# Patient Record
Sex: MALE | Race: OTHER | NOT HISPANIC OR LATINO | URBAN - METROPOLITAN AREA
[De-identification: names, ages, dates, MRNs, and addresses within clinical notes are randomized per-mention and may not be internally consistent; named-entity substitution may affect disease eponyms.]

---

## 2018-08-05 ENCOUNTER — EMERGENCY (EMERGENCY)
Facility: HOSPITAL | Age: 53
LOS: 1 days | Discharge: HOME | End: 2018-08-05
Attending: EMERGENCY MEDICINE
Payer: COMMERCIAL

## 2018-08-05 VITALS — HEIGHT: 67 IN | WEIGHT: 134.04 LBS

## 2018-08-05 VITALS
DIASTOLIC BLOOD PRESSURE: 72 MMHG | TEMPERATURE: 98 F | RESPIRATION RATE: 20 BRPM | SYSTOLIC BLOOD PRESSURE: 117 MMHG | HEART RATE: 61 BPM | OXYGEN SATURATION: 100 %

## 2018-08-05 LAB
ALBUMIN SERPL ELPH-MCNC: 4.4 G/DL — SIGNIFICANT CHANGE UP (ref 3.3–5.2)
ALP SERPL-CCNC: 81 U/L — SIGNIFICANT CHANGE UP (ref 40–120)
ALT FLD-CCNC: 10 U/L — SIGNIFICANT CHANGE UP
ANION GAP SERPL CALC-SCNC: 13 MMOL/L — SIGNIFICANT CHANGE UP (ref 5–17)
APTT BLD: 35.9 SEC — SIGNIFICANT CHANGE UP (ref 27.5–37.4)
AST SERPL-CCNC: 18 U/L — SIGNIFICANT CHANGE UP
BASOPHILS # BLD AUTO: 0 K/UL — SIGNIFICANT CHANGE UP (ref 0–0.2)
BASOPHILS NFR BLD AUTO: 0.3 % — SIGNIFICANT CHANGE UP (ref 0–2)
BILIRUB SERPL-MCNC: 0.6 MG/DL — SIGNIFICANT CHANGE UP (ref 0.4–2)
BUN SERPL-MCNC: 10 MG/DL — SIGNIFICANT CHANGE UP (ref 8–20)
CALCIUM SERPL-MCNC: 9.9 MG/DL — SIGNIFICANT CHANGE UP (ref 8.6–10.2)
CHLORIDE SERPL-SCNC: 102 MMOL/L — SIGNIFICANT CHANGE UP (ref 98–107)
CO2 SERPL-SCNC: 26 MMOL/L — SIGNIFICANT CHANGE UP (ref 22–29)
CREAT SERPL-MCNC: 0.76 MG/DL — SIGNIFICANT CHANGE UP (ref 0.5–1.3)
EOSINOPHIL # BLD AUTO: 1 K/UL — HIGH (ref 0–0.5)
EOSINOPHIL NFR BLD AUTO: 11.3 % — HIGH (ref 0–5)
GLUCOSE SERPL-MCNC: 104 MG/DL — SIGNIFICANT CHANGE UP (ref 70–115)
HCT VFR BLD CALC: 47.8 % — SIGNIFICANT CHANGE UP (ref 42–52)
HGB BLD-MCNC: 15.8 G/DL — SIGNIFICANT CHANGE UP (ref 14–18)
INR BLD: 1.09 RATIO — SIGNIFICANT CHANGE UP (ref 0.88–1.16)
LYMPHOCYTES # BLD AUTO: 3.1 K/UL — SIGNIFICANT CHANGE UP (ref 1–4.8)
LYMPHOCYTES # BLD AUTO: 35 % — SIGNIFICANT CHANGE UP (ref 20–55)
MCHC RBC-ENTMCNC: 30.8 PG — SIGNIFICANT CHANGE UP (ref 27–31)
MCHC RBC-ENTMCNC: 33.1 G/DL — SIGNIFICANT CHANGE UP (ref 32–36)
MCV RBC AUTO: 93.2 FL — SIGNIFICANT CHANGE UP (ref 80–94)
MONOCYTES # BLD AUTO: 0.8 K/UL — SIGNIFICANT CHANGE UP (ref 0–0.8)
MONOCYTES NFR BLD AUTO: 8.9 % — SIGNIFICANT CHANGE UP (ref 3–10)
NEUTROPHILS # BLD AUTO: 3.9 K/UL — SIGNIFICANT CHANGE UP (ref 1.8–8)
NEUTROPHILS NFR BLD AUTO: 44.3 % — SIGNIFICANT CHANGE UP (ref 37–73)
PLATELET # BLD AUTO: 243 K/UL — SIGNIFICANT CHANGE UP (ref 150–400)
POTASSIUM SERPL-MCNC: 4.5 MMOL/L — SIGNIFICANT CHANGE UP (ref 3.5–5.3)
POTASSIUM SERPL-SCNC: 4.5 MMOL/L — SIGNIFICANT CHANGE UP (ref 3.5–5.3)
PROT SERPL-MCNC: 8.2 G/DL — SIGNIFICANT CHANGE UP (ref 6.6–8.7)
PROTHROM AB SERPL-ACNC: 12 SEC — SIGNIFICANT CHANGE UP (ref 9.8–12.7)
RBC # BLD: 5.13 M/UL — SIGNIFICANT CHANGE UP (ref 4.6–6.2)
RBC # FLD: 14 % — SIGNIFICANT CHANGE UP (ref 11–15.6)
SODIUM SERPL-SCNC: 141 MMOL/L — SIGNIFICANT CHANGE UP (ref 135–145)
WBC # BLD: 8.8 K/UL — SIGNIFICANT CHANGE UP (ref 4.8–10.8)
WBC # FLD AUTO: 8.8 K/UL — SIGNIFICANT CHANGE UP (ref 4.8–10.8)

## 2018-08-05 PROCEDURE — 85730 THROMBOPLASTIN TIME PARTIAL: CPT

## 2018-08-05 PROCEDURE — 36415 COLL VENOUS BLD VENIPUNCTURE: CPT

## 2018-08-05 PROCEDURE — 85027 COMPLETE CBC AUTOMATED: CPT

## 2018-08-05 PROCEDURE — 80053 COMPREHEN METABOLIC PANEL: CPT

## 2018-08-05 PROCEDURE — 99285 EMERGENCY DEPT VISIT HI MDM: CPT | Mod: 25

## 2018-08-05 PROCEDURE — 96374 THER/PROPH/DIAG INJ IV PUSH: CPT

## 2018-08-05 PROCEDURE — 85610 PROTHROMBIN TIME: CPT

## 2018-08-05 PROCEDURE — 76870 US EXAM SCROTUM: CPT | Mod: 26

## 2018-08-05 PROCEDURE — 76870 US EXAM SCROTUM: CPT

## 2018-08-05 PROCEDURE — 96375 TX/PRO/DX INJ NEW DRUG ADDON: CPT

## 2018-08-05 PROCEDURE — 99284 EMERGENCY DEPT VISIT MOD MDM: CPT | Mod: 25

## 2018-08-05 RX ORDER — SODIUM CHLORIDE 9 MG/ML
3 INJECTION INTRAMUSCULAR; INTRAVENOUS; SUBCUTANEOUS ONCE
Qty: 0 | Refills: 0 | Status: COMPLETED | OUTPATIENT
Start: 2018-08-05 | End: 2018-08-05

## 2018-08-05 RX ORDER — ONDANSETRON 8 MG/1
4 TABLET, FILM COATED ORAL ONCE
Qty: 0 | Refills: 0 | Status: COMPLETED | OUTPATIENT
Start: 2018-08-05 | End: 2018-08-05

## 2018-08-05 RX ORDER — MORPHINE SULFATE 50 MG/1
4 CAPSULE, EXTENDED RELEASE ORAL ONCE
Qty: 0 | Refills: 0 | Status: DISCONTINUED | OUTPATIENT
Start: 2018-08-05 | End: 2018-08-05

## 2018-08-05 RX ADMIN — ONDANSETRON 4 MILLIGRAM(S): 8 TABLET, FILM COATED ORAL at 10:06

## 2018-08-05 RX ADMIN — MORPHINE SULFATE 4 MILLIGRAM(S): 50 CAPSULE, EXTENDED RELEASE ORAL at 10:56

## 2018-08-05 RX ADMIN — MORPHINE SULFATE 4 MILLIGRAM(S): 50 CAPSULE, EXTENDED RELEASE ORAL at 10:07

## 2018-08-05 RX ADMIN — SODIUM CHLORIDE 3 MILLILITER(S): 9 INJECTION INTRAMUSCULAR; INTRAVENOUS; SUBCUTANEOUS at 10:06

## 2018-08-05 NOTE — ED ADULT NURSE NOTE - OBJECTIVE STATEMENT
pt c/o sudden pop sensation with immense pain after lifting paint. Left groin area swollen all the way down into scrotum

## 2018-08-05 NOTE — ED ADULT NURSE NOTE - IN THE PAST 12 MONTHS HAVE YOU USED DRUGS OTHER THAN THOSE REQUIRED FOR MEDICAL REASON?
HEARING AID ORIENTATION/FITTING    AUDIOLOGY  Hearing aids purchased 17: Oticon OPN2 miniRITEs silver gray  Domes: MiniFit bass double vent 10mm  Wax guards: ProWax miniFit  Speaker: #4 [phone]  Ser#s: [id number]  Warranty and L&D : 18  [employer]  # [id number] exp: 18  LK    Fitting checklist:  Hearing evaluation performed within past 6 months  Purchase agreement signed and reviewed warranty/hearing aid life expectancy  Payment collected and  receipt completed  Reviewed departmental policies    Patient shown parts of hearing aid(s):  volume control, battery compartment and on/off switch  Review of battery changing and safety. Battery size: 312. Level of assistance required:  independent  Insertion and removal of hearing aid/earmold. Level of assistance required:  independent  Cleaning of hearing aid/earmold. Level of assistance required:  independent.   Wax guard system:  Yes: ProWax miniFit  Reviewed hearing aid instruction booklet, troubleshooting.  Reviewed telephone use.   Accessories provided: LuCatchFree     Programming:  Software program/cords used: Genie2/ fitting link  Initial settings: Step 3 (experienced hearing aid wearer)  Program destinations:   1: universal   2. none   3. none   4. none    Patient Impressions:  He is very pleased with how well he can hear with these in comparison to his old Oticon Tego aids. He also feels they are more comfortable.     Audiologist Initial Evaluation of Performance:  He is able to hear me well at a distance of 15 feet as I asked random questions at a normal conversational level.     Further Recommendations of assistive listening devices and/or accessories:  Oticon ON JHONY, however he does not have a smart phone- this is recommended in the event that he ever gets a smart phone as it has a \"find my hearing aids\" feature that we discussed.     Follow-Up to be scheduled:  Two weeks.     
No

## 2018-08-05 NOTE — CONSULT NOTE ADULT - ASSESSMENT
54yo male presents with reducible left inguinal hernia 54yo male presents with reducible left inguinal hernia  -US evident for Bowel containing, large left scrotal hernia      Plan:  -No acute surgical intervention  -hernia reduced at bedside. no evident scenes of ischemia  -Pt to follow up in to [facility] for outpatient surgical repair  -Pt educated in smoking cessation in preparation for outpatient surgical repair and healthy lifestyle

## 2018-08-05 NOTE — ED PROVIDER NOTE - MEDICAL DECISION MAKING DETAILS
Surgery called  after physical exam due to concern for incarcerated hernia, labs, pain control and re-evaluate.

## 2018-08-05 NOTE — CONSULT NOTE ADULT - SUBJECTIVE AND OBJECTIVE BOX
-----TO BE DISCUSSED WITH ATTENDING-----        GENERAL SURGERY CONSULT    Consulting surgical team: [facility] - Acute Care Surgery   Consulting attending: Marilou  Patient seen and examined: 08-05-18 @ 08:50    HPI:  52yo male presents with 5 day history of left groin pain and bulge to area. Pt states he lifting 5 gallon jug of paint where he felt a pop and pain to left groin. he states bulge has increase over past several days. Today he report lower abdominal pain. Reports regular bowel movement and flatus. Denies nausea, vomiting, fever, and chills. Pain is worse when he strains for bowel movement.      PAST MEDICAL HISTORY:  Denies    PAST SURGICAL HISTORY:  Denies    SOCIAL HISTORY  Cigarettes (1 pack per day). Denies alcohol and illicit drugs.    ALLERGIES:  No Known Allergies      MEDICATIONS:  morphine  - Injectable 4 milliGRAM(s) IV Push once  ondansetron Injectable 4 milliGRAM(s) IV Push once  sodium chloride 0.9% lock flush 3 milliLiter(s) IV Push once      VITALS & I/Os:  Vital Signs Last 24 Hrs  T(C): 36.9 (05 Aug 2018 07:24), Max: 36.9 (05 Aug 2018 07:24)  T(F): 98.5 (05 Aug 2018 07:24), Max: 98.5 (05 Aug 2018 07:24)  HR: 66 (05 Aug 2018 07:24) (66 - 66)  BP: 105/67 (05 Aug 2018 07:24) (105/67 - 105/67)  BP(mean): --  RR: 18 (05 Aug 2018 07:24) (18 - 18)  SpO2: 100% (05 Aug 2018 07:24) (100% - 100%)    I&O's Summary      PHYSICAL EXAM:  General: No acute distress  Respiratory: Nonlabored  Cardiovascular: RRR  Abdominal: mild tenderness to left lower quadrant. no guarding or rebound.  Groin: soft bulge appreciated to left groin  : hernia with intestinal contents appreciated. Reducible. Testicles palpated.  Extremities: Warm  Vascular: Radial pulse 2+, bilateral    LABS:          Lactate:                    IMAGING:      ASSESSMENT:  53y Male    PLAN:      Discussed with  GENERAL SURGERY CONSULT    Consulting surgical team: [facility] - Acute [facility] Surgery   Consulting attending: Marilou  Patient seen and examined: 08-05-18 @ 08:50    HPI:  52yo male presents with 5 day history of left groin pain and bulge to area. Pt states he lifting 5 gallon jug of paint where he felt a pop and pain to left groin. he states bulge has increase over past several days. Today he report lower abdominal pain. Reports regular bowel movement and flatus. Denies nausea, vomiting, fever, and chills. Pain is worse when he strains for bowel movement.      PAST MEDICAL HISTORY:  Denies    PAST SURGICAL HISTORY:  Denies    SOCIAL HISTORY  Cigarettes (1 pack per day). Denies alcohol and illicit drugs.    ALLERGIES:  No Known Allergies      MEDICATIONS:  morphine  - Injectable 4 milliGRAM(s) IV Push once  ondansetron Injectable 4 milliGRAM(s) IV Push once  sodium chloride 0.9% lock flush 3 milliLiter(s) IV Push once      VITALS & I/Os:  Vital Signs Last 24 Hrs  T(C): 36.9 (05 Aug 2018 07:24), Max: 36.9 (05 Aug 2018 07:24)  T(F): 98.5 (05 Aug 2018 07:24), Max: 98.5 (05 Aug 2018 07:24)  HR: 66 (05 Aug 2018 07:24) (66 - 66)  BP: 105/67 (05 Aug 2018 07:24) (105/67 - 105/67)  BP(mean): --  RR: 18 (05 Aug 2018 07:24) (18 - 18)  SpO2: 100% (05 Aug 2018 07:24) (100% - 100%)    I&O's Summary      PHYSICAL EXAM:  General: No acute distress  Respiratory: Nonlabored  Cardiovascular: RRR  Abdominal: mild tenderness to left lower quadrant. no guarding or rebound.  Groin: soft bulge appreciated to left groin. No skin changes.  : hernia with intestinal contents appreciated. Reducible. Testicles palpated.  Extremities: Warm  Vascular: Radial pulse 2+, bilateral    LABS:          Lactate:                    IMAGING:      ASSESSMENT:  53y Male    PLAN:      Discussed with

## 2018-08-05 NOTE — ED PROVIDER NOTE - OBJECTIVE STATEMENT
52 y/o M with no pertinent PMHx presents to ED c/o L groin pain rated 8/10 s/p lifting several heavy paint cans a few days prior. Patient notes that immediately after lifting the cans had pain, but recently has noted a bulging area that extends into the scrotum. Denies fevers, chills, nausea, vomiting, CP or SOB. No further acute complaints at this time.

## 2018-08-05 NOTE — ED ADULT NURSE NOTE - NSIMPLEMENTINTERV_GEN_ALL_ED
Implemented All Universal Safety Interventions:  [place] to call system. Call bell, personal items and telephone within reach. Instruct patient to call for assistance. Room bathroom lighting operational. Non-slip footwear when patient is off stretcher. Physically safe environment: no spills, clutter or unnecessary equipment. Stretcher in lowest position, wheels locked, appropriate side rails in place.

## 2018-08-05 NOTE — ED PROVIDER NOTE - CONSTITUTIONAL, MLM
normal... Well appearing, well nourished, awake, alert, oriented to person, place, time/situation and is moderately uncomfortable.

## 2018-08-05 NOTE — ED PROVIDER NOTE - GENITOURINARY, MLM
L inguinal bulging mass that extends into the L hemiscrotum, tender to palpation. Unable to reduce with manual pressure.

## 2018-08-14 PROBLEM — Z00.00 ENCOUNTER FOR PREVENTIVE HEALTH EXAMINATION: Status: ACTIVE | Noted: 2018-08-14

## 2018-08-23 ENCOUNTER — APPOINTMENT (OUTPATIENT)
Dept: TRAUMA SURGERY | Facility: CLINIC | Age: 53
End: 2018-08-23
Payer: COMMERCIAL

## 2018-08-23 VITALS
HEART RATE: 83 BPM | BODY MASS INDEX: 20.88 KG/M2 | WEIGHT: 133 LBS | OXYGEN SATURATION: 3 % | HEIGHT: 67 IN | SYSTOLIC BLOOD PRESSURE: 108 MMHG | TEMPERATURE: 98 F | DIASTOLIC BLOOD PRESSURE: 73 MMHG

## 2018-08-23 PROCEDURE — 99213 OFFICE O/P EST LOW 20 MIN: CPT

## 2018-08-28 ENCOUNTER — OUTPATIENT (OUTPATIENT)
Dept: OUTPATIENT SERVICES | Facility: HOSPITAL | Age: 53
LOS: 1 days | End: 2018-08-28
Payer: COMMERCIAL

## 2018-08-28 VITALS
SYSTOLIC BLOOD PRESSURE: 103 MMHG | DIASTOLIC BLOOD PRESSURE: 71 MMHG | HEIGHT: 67 IN | HEART RATE: 77 BPM | TEMPERATURE: 98 F | RESPIRATION RATE: 16 BRPM | WEIGHT: 125.66 LBS

## 2018-08-28 DIAGNOSIS — Z01.818 ENCOUNTER FOR OTHER PREPROCEDURAL EXAMINATION: ICD-10-CM

## 2018-08-28 DIAGNOSIS — K40.90 UNILATERAL INGUINAL HERNIA, WITHOUT OBSTRUCTION OR GANGRENE, NOT SPECIFIED AS RECURRENT: ICD-10-CM

## 2018-08-28 DIAGNOSIS — Z29.9 ENCOUNTER FOR PROPHYLACTIC MEASURES, UNSPECIFIED: ICD-10-CM

## 2018-08-28 LAB
ANION GAP SERPL CALC-SCNC: 12 MMOL/L — SIGNIFICANT CHANGE UP (ref 5–17)
BLD GP AB SCN SERPL QL: SIGNIFICANT CHANGE UP
BUN SERPL-MCNC: 17 MG/DL — SIGNIFICANT CHANGE UP (ref 8–20)
CALCIUM SERPL-MCNC: 9.8 MG/DL — SIGNIFICANT CHANGE UP (ref 8.6–10.2)
CHLORIDE SERPL-SCNC: 101 MMOL/L — SIGNIFICANT CHANGE UP (ref 98–107)
CO2 SERPL-SCNC: 27 MMOL/L — SIGNIFICANT CHANGE UP (ref 22–29)
CREAT SERPL-MCNC: 0.93 MG/DL — SIGNIFICANT CHANGE UP (ref 0.5–1.3)
GLUCOSE SERPL-MCNC: 88 MG/DL — SIGNIFICANT CHANGE UP (ref 70–115)
HCT VFR BLD CALC: 48 % — SIGNIFICANT CHANGE UP (ref 42–52)
HGB BLD-MCNC: 15.9 G/DL — SIGNIFICANT CHANGE UP (ref 14–18)
MCHC RBC-ENTMCNC: 30.8 PG — SIGNIFICANT CHANGE UP (ref 27–31)
MCHC RBC-ENTMCNC: 33.1 G/DL — SIGNIFICANT CHANGE UP (ref 32–36)
MCV RBC AUTO: 93 FL — SIGNIFICANT CHANGE UP (ref 80–94)
PLATELET # BLD AUTO: 204 K/UL — SIGNIFICANT CHANGE UP (ref 150–400)
POTASSIUM SERPL-MCNC: 4.4 MMOL/L — SIGNIFICANT CHANGE UP (ref 3.5–5.3)
POTASSIUM SERPL-SCNC: 4.4 MMOL/L — SIGNIFICANT CHANGE UP (ref 3.5–5.3)
RBC # BLD: 5.16 M/UL — SIGNIFICANT CHANGE UP (ref 4.6–6.2)
RBC # FLD: 14.7 % — SIGNIFICANT CHANGE UP (ref 11–15.6)
SODIUM SERPL-SCNC: 140 MMOL/L — SIGNIFICANT CHANGE UP (ref 135–145)
TYPE + AB SCN PNL BLD: SIGNIFICANT CHANGE UP
WBC # BLD: 9.1 K/UL — SIGNIFICANT CHANGE UP (ref 4.8–10.8)
WBC # FLD AUTO: 9.1 K/UL — SIGNIFICANT CHANGE UP (ref 4.8–10.8)

## 2018-08-28 PROCEDURE — 85027 COMPLETE CBC AUTOMATED: CPT

## 2018-08-28 PROCEDURE — 86850 RBC ANTIBODY SCREEN: CPT

## 2018-08-28 PROCEDURE — 86900 BLOOD TYPING SEROLOGIC ABO: CPT

## 2018-08-28 PROCEDURE — 36415 COLL VENOUS BLD VENIPUNCTURE: CPT

## 2018-08-28 PROCEDURE — 86901 BLOOD TYPING SEROLOGIC RH(D): CPT

## 2018-08-28 PROCEDURE — 93010 ELECTROCARDIOGRAM REPORT: CPT

## 2018-08-28 PROCEDURE — 80048 BASIC METABOLIC PNL TOTAL CA: CPT

## 2018-08-28 PROCEDURE — G0463: CPT

## 2018-08-28 PROCEDURE — 93005 ELECTROCARDIOGRAM TRACING: CPT

## 2018-08-28 RX ORDER — SODIUM CHLORIDE 9 MG/ML
3 INJECTION INTRAMUSCULAR; INTRAVENOUS; SUBCUTANEOUS ONCE
Qty: 0 | Refills: 0 | Status: DISCONTINUED | OUTPATIENT
Start: 2018-08-31 | End: 2018-08-31

## 2018-08-28 RX ORDER — CEFAZOLIN SODIUM 1 G
2000 VIAL (EA) INJECTION ONCE
Qty: 0 | Refills: 0 | Status: DISCONTINUED | OUTPATIENT
Start: 2018-08-31 | End: 2018-09-15

## 2018-08-28 NOTE — H&P PST ADULT - ATTENDING COMMENTS
Left inguinal-scrotal hernia  Symptomatic  For repair.  Discussed risks, benefits, and alternatives, including the risk of ischemic orchitis.

## 2018-08-28 NOTE — H&P PST ADULT - NSANTHOSAYNRD_GEN_A_CORE
No. MOY screening performed.  STOP BANG Legend: 0-2 = LOW Risk; 3-4 = INTERMEDIATE Risk; 5-8 = HIGH Risk

## 2018-08-28 NOTE — H&P PST ADULT - HISTORY OF PRESENT ILLNESS
53 year old male with a left inguinal hernia 53 year old male with a left inguinal hernia states that he noticed it a year ago and has on an off pain

## 2018-08-28 NOTE — H&P PST ADULT - ASSESSMENT
CAPRINI SCORE [CLOT]    AGE RELATED RISK FACTORS                                                       MOBILITY RELATED FACTORS  [x ] Age 41-60 years                                            (1 Point)                  [ ] Bed rest                                                        (1 Point)  [ ] Age: 61-74 years                                           (2 Points)                 [ ] Plaster cast                                                   (2 Points)  [ ] Age= 75 years                                              (3 Points)                 [ ] Bed bound for more than 72 hours                 (2 Points)    DISEASE RELATED RISK FACTORS                                               GENDER SPECIFIC FACTORS  [ ] Edema in the lower extremities                       (1 Point)                  [ ] Pregnancy                                                     (1 Point)  [ ] Varicose veins                                               (1 Point)                  [ ] Post-partum < 6 weeks                                   (1 Point)             [ ] BMI > 25 Kg/m2                                            (1 Point)                  [ ] Hormonal therapy  or oral contraception          (1 Point)                 [ ] Sepsis (in the previous month)                        (1 Point)                  [ ] History of pregnancy complications                 (1 point)  [ ] Pneumonia or serious lung disease                                               [ ] Unexplained or recurrent                     (1 Point)           (in the previous month)                               (1 Point)  [ ] Abnormal pulmonary function test                     (1 Point)                 SURGERY RELATED RISK FACTORS  [ ] Acute myocardial infarction                              (1 Point)                 [ ]  Section                                             (1 Point)  [ ] Congestive heart failure (in the previous month)  (1 Point)               [ ] Minor surgery                                                  (1 Point)   [ ] Inflammatory bowel disease                             (1 Point)                 [ ] Arthroscopic surgery                                        (2 Points)  [ ] Central venous access                                      (2 Points)                [x ] General surgery lasting more than 45 minutes   (2 Points)       [ ] Stroke (in the previous month)                          (5 Points)               [ ] Elective arthroplasty                                         (5 Points)                                                                                                                                               HEMATOLOGY RELATED FACTORS                                                 TRAUMA RELATED RISK FACTORS  [ ] Prior episodes of VTE                                     (3 Points)                 [ ] Fracture of the hip, pelvis, or leg                       (5 Points)  [ ] Positive family history for VTE                         (3 Points)                 [ ] Acute spinal cord injury (in the previous month)  (5 Points)  [ ] Prothrombin 59477 A                                     (3 Points)                 [ ] Paralysis  (less than 1 month)                             (5 Points)  [ ] Factor V Leiden                                             (3 Points)                  [ ] Multiple Trauma within 1 month                        (5 Points)  [ ] Lupus anticoagulants                                     (3 Points)                                                           [ ] Anticardiolipin antibodies                               (3 Points)                                                       [ ] High homocysteine in the blood                      (3 Points)                                             [ ] Other congenital or acquired thrombophilia      (3 Points)                                                [ ] Heparin induced thrombocytopenia                  (3 Points)                                          Total Score [     3     ]

## 2018-08-30 ENCOUNTER — TRANSCRIPTION ENCOUNTER (OUTPATIENT)
Age: 53
End: 2018-08-30

## 2018-08-31 ENCOUNTER — RESULT REVIEW (OUTPATIENT)
Age: 53
End: 2018-08-31

## 2018-08-31 ENCOUNTER — OUTPATIENT (OUTPATIENT)
Dept: OUTPATIENT SERVICES | Facility: HOSPITAL | Age: 53
LOS: 1 days | End: 2018-08-31
Payer: COMMERCIAL

## 2018-08-31 VITALS
DIASTOLIC BLOOD PRESSURE: 56 MMHG | SYSTOLIC BLOOD PRESSURE: 98 MMHG | RESPIRATION RATE: 16 BRPM | WEIGHT: 134.92 LBS | TEMPERATURE: 98 F | HEART RATE: 74 BPM | OXYGEN SATURATION: 100 % | HEIGHT: 67 IN

## 2018-08-31 VITALS
OXYGEN SATURATION: 100 % | RESPIRATION RATE: 14 BRPM | HEART RATE: 70 BPM | DIASTOLIC BLOOD PRESSURE: 72 MMHG | SYSTOLIC BLOOD PRESSURE: 110 MMHG

## 2018-08-31 DIAGNOSIS — K40.20 BILATERAL INGUINAL HERNIA, WITHOUT OBSTRUCTION OR GANGRENE, NOT SPECIFIED AS RECURRENT: ICD-10-CM

## 2018-08-31 DIAGNOSIS — Z01.818 ENCOUNTER FOR OTHER PREPROCEDURAL EXAMINATION: ICD-10-CM

## 2018-08-31 PROCEDURE — 88302 TISSUE EXAM BY PATHOLOGIST: CPT | Mod: 26

## 2018-08-31 PROCEDURE — C1781: CPT

## 2018-08-31 PROCEDURE — 88302 TISSUE EXAM BY PATHOLOGIST: CPT

## 2018-08-31 PROCEDURE — 49507 PRP I/HERN INIT BLOCK >5 YR: CPT

## 2018-08-31 PROCEDURE — 49507 PRP I/HERN INIT BLOCK >5 YR: CPT | Mod: LT

## 2018-08-31 RX ORDER — ONDANSETRON 8 MG/1
4 TABLET, FILM COATED ORAL ONCE
Qty: 0 | Refills: 0 | Status: DISCONTINUED | OUTPATIENT
Start: 2018-08-31 | End: 2018-08-31

## 2018-08-31 RX ORDER — SODIUM CHLORIDE 9 MG/ML
1000 INJECTION, SOLUTION INTRAVENOUS
Qty: 0 | Refills: 0 | Status: DISCONTINUED | OUTPATIENT
Start: 2018-08-31 | End: 2018-08-31

## 2018-08-31 RX ORDER — FENTANYL CITRATE 50 UG/ML
50 INJECTION INTRAVENOUS
Qty: 0 | Refills: 0 | Status: DISCONTINUED | OUTPATIENT
Start: 2018-08-31 | End: 2018-08-31

## 2018-08-31 RX ORDER — TRAMADOL HYDROCHLORIDE 50 MG/1
1 TABLET ORAL
Qty: 12 | Refills: 0 | OUTPATIENT
Start: 2018-08-31

## 2018-08-31 NOTE — BRIEF OPERATIVE NOTE - PROCEDURE
<<-----Click on this checkbox to enter Procedure Inguinal hernia repair, left  08/31/2018  with mesh and plug  Active  BELKIS

## 2018-08-31 NOTE — ASU DISCHARGE PLAN (ADULT/PEDIATRIC). - NOTIFY
Fever greater than 101/Swelling that continues/Unable to Urinate/Pain not relieved by Medications/Inability to Tolerate Liquids or Foods/Bleeding that does not stop

## 2018-09-08 ENCOUNTER — TRANSCRIPTION ENCOUNTER (OUTPATIENT)
Age: 53
End: 2018-09-08

## 2018-09-08 ENCOUNTER — INPATIENT (INPATIENT)
Facility: HOSPITAL | Age: 53
LOS: 1 days | Discharge: HOME | DRG: 909 | End: 2018-09-10
Attending: STUDENT IN AN ORGANIZED HEALTH CARE EDUCATION/TRAINING PROGRAM | Admitting: STUDENT IN AN ORGANIZED HEALTH CARE EDUCATION/TRAINING PROGRAM
Payer: COMMERCIAL

## 2018-09-08 VITALS — HEIGHT: 67 IN | WEIGHT: 134.92 LBS

## 2018-09-08 DIAGNOSIS — R10.32 LEFT LOWER QUADRANT PAIN: ICD-10-CM

## 2018-09-08 DIAGNOSIS — Z98.890 OTHER SPECIFIED POSTPROCEDURAL STATES: Chronic | ICD-10-CM

## 2018-09-08 LAB
ALBUMIN SERPL ELPH-MCNC: 4 G/DL — SIGNIFICANT CHANGE UP (ref 3.3–5.2)
ALP SERPL-CCNC: 67 U/L — SIGNIFICANT CHANGE UP (ref 40–120)
ALT FLD-CCNC: 13 U/L — SIGNIFICANT CHANGE UP
ANION GAP SERPL CALC-SCNC: 13 MMOL/L — SIGNIFICANT CHANGE UP (ref 5–17)
APPEARANCE UR: CLEAR — SIGNIFICANT CHANGE UP
AST SERPL-CCNC: 15 U/L — SIGNIFICANT CHANGE UP
BILIRUB SERPL-MCNC: 1.1 MG/DL — SIGNIFICANT CHANGE UP (ref 0.4–2)
BILIRUB UR-MCNC: NEGATIVE — SIGNIFICANT CHANGE UP
BUN SERPL-MCNC: 19 MG/DL — SIGNIFICANT CHANGE UP (ref 8–20)
CALCIUM SERPL-MCNC: 10.2 MG/DL — SIGNIFICANT CHANGE UP (ref 8.6–10.2)
CHLORIDE SERPL-SCNC: 97 MMOL/L — LOW (ref 98–107)
CO2 SERPL-SCNC: 26 MMOL/L — SIGNIFICANT CHANGE UP (ref 22–29)
COLOR SPEC: YELLOW — SIGNIFICANT CHANGE UP
CREAT SERPL-MCNC: 0.76 MG/DL — SIGNIFICANT CHANGE UP (ref 0.5–1.3)
DIFF PNL FLD: ABNORMAL
EOSINOPHIL NFR BLD AUTO: 6 % — SIGNIFICANT CHANGE UP (ref 0–6)
GLUCOSE SERPL-MCNC: 70 MG/DL — SIGNIFICANT CHANGE UP (ref 70–115)
GLUCOSE UR QL: NEGATIVE MG/DL — SIGNIFICANT CHANGE UP
HCT VFR BLD CALC: 40.8 % — LOW (ref 42–52)
HGB BLD-MCNC: 13.5 G/DL — LOW (ref 14–18)
KETONES UR-MCNC: NEGATIVE — SIGNIFICANT CHANGE UP
LEUKOCYTE ESTERASE UR-ACNC: ABNORMAL
LYMPHOCYTES # BLD AUTO: 24 % — SIGNIFICANT CHANGE UP (ref 20–55)
MCHC RBC-ENTMCNC: 30.3 PG — SIGNIFICANT CHANGE UP (ref 27–31)
MCHC RBC-ENTMCNC: 33.1 G/DL — SIGNIFICANT CHANGE UP (ref 32–36)
MCV RBC AUTO: 91.7 FL — SIGNIFICANT CHANGE UP (ref 80–94)
MONOCYTES NFR BLD AUTO: 16 % — HIGH (ref 3–10)
NEUTROPHILS NFR BLD AUTO: 52 % — SIGNIFICANT CHANGE UP (ref 37–73)
NITRITE UR-MCNC: NEGATIVE — SIGNIFICANT CHANGE UP
PH UR: 6.5 — SIGNIFICANT CHANGE UP (ref 5–8)
PLATELET # BLD AUTO: 303 K/UL — SIGNIFICANT CHANGE UP (ref 150–400)
POTASSIUM SERPL-MCNC: 4.4 MMOL/L — SIGNIFICANT CHANGE UP (ref 3.5–5.3)
POTASSIUM SERPL-SCNC: 4.4 MMOL/L — SIGNIFICANT CHANGE UP (ref 3.5–5.3)
PROT SERPL-MCNC: 8.3 G/DL — SIGNIFICANT CHANGE UP (ref 6.6–8.7)
PROT UR-MCNC: 15 MG/DL
RBC # BLD: 4.45 M/UL — LOW (ref 4.6–6.2)
RBC # FLD: 14 % — SIGNIFICANT CHANGE UP (ref 11–15.6)
SODIUM SERPL-SCNC: 136 MMOL/L — SIGNIFICANT CHANGE UP (ref 135–145)
SP GR SPEC: 1.01 — SIGNIFICANT CHANGE UP (ref 1.01–1.02)
UROBILINOGEN FLD QL: 1 MG/DL
WBC # BLD: 16.4 K/UL — HIGH (ref 4.8–10.8)
WBC # FLD AUTO: 16.4 K/UL — HIGH (ref 4.8–10.8)

## 2018-09-08 PROCEDURE — 76870 US EXAM SCROTUM: CPT | Mod: 26

## 2018-09-08 PROCEDURE — 74177 CT ABD & PELVIS W/CONTRAST: CPT | Mod: 26

## 2018-09-08 PROCEDURE — 99284 EMERGENCY DEPT VISIT MOD MDM: CPT

## 2018-09-08 RX ORDER — ONDANSETRON 8 MG/1
4 TABLET, FILM COATED ORAL EVERY 6 HOURS
Qty: 0 | Refills: 0 | Status: DISCONTINUED | OUTPATIENT
Start: 2018-09-08 | End: 2018-09-09

## 2018-09-08 RX ORDER — SODIUM CHLORIDE 9 MG/ML
1000 INJECTION, SOLUTION INTRAVENOUS
Qty: 0 | Refills: 0 | Status: DISCONTINUED | OUTPATIENT
Start: 2018-09-08 | End: 2018-09-09

## 2018-09-08 RX ORDER — MORPHINE SULFATE 50 MG/1
2 CAPSULE, EXTENDED RELEASE ORAL EVERY 4 HOURS
Qty: 0 | Refills: 0 | Status: DISCONTINUED | OUTPATIENT
Start: 2018-09-08 | End: 2018-09-09

## 2018-09-08 RX ORDER — MORPHINE SULFATE 50 MG/1
4 CAPSULE, EXTENDED RELEASE ORAL EVERY 4 HOURS
Qty: 0 | Refills: 0 | Status: DISCONTINUED | OUTPATIENT
Start: 2018-09-08 | End: 2018-09-09

## 2018-09-08 RX ORDER — ACETAMINOPHEN 500 MG
650 TABLET ORAL EVERY 6 HOURS
Qty: 0 | Refills: 0 | Status: DISCONTINUED | OUTPATIENT
Start: 2018-09-08 | End: 2018-09-09

## 2018-09-08 RX ORDER — KETOROLAC TROMETHAMINE 30 MG/ML
30 SYRINGE (ML) INJECTION ONCE
Qty: 0 | Refills: 0 | Status: DISCONTINUED | OUTPATIENT
Start: 2018-09-08 | End: 2018-09-08

## 2018-09-08 RX ADMIN — Medication 30 MILLIGRAM(S): at 15:40

## 2018-09-08 RX ADMIN — Medication 30 MILLIGRAM(S): at 14:28

## 2018-09-08 RX ADMIN — SODIUM CHLORIDE 100 MILLILITER(S): 9 INJECTION, SOLUTION INTRAVENOUS at 23:41

## 2018-09-08 NOTE — ED ADULT NURSE REASSESSMENT NOTE - NS ED NURSE REASSESS COMMENT FT1
Assumed care of pt @ 2330. pt a+ox3, lying comfortably in bed. pt denies any pain or discomfort, awaiting bed. will continue to monitor.

## 2018-09-08 NOTE — ED STATDOCS - NS_ ATTENDINGSCRIBEDETAILS _ED_A_ED_FT
I, Sophy Mars, performed the initial face to face bedside interview with this patient regarding history of present illness, review of symptoms and relevant past medical, social and family history.  I completed an independent physical examination.  I was the provider who initially evaluated this patient.  The history, relevant review of systems, past medical and surgical history, medical decision making, and physical examination was documented by the scribe in my presence and I attest to the accuracy of the documentation. Follow-up on ordered tests (ie labs, radiologic studies) and re-evaluation of the patient's status has been communicated to the ACP.  Disposition of the patient will be based on test outcome and response to ED interventions.

## 2018-09-08 NOTE — ED STATDOCS - MEDICAL DECISION MAKING DETAILS
Patient with history right inguinal hernia repair with sudden onset pain and significant swelling to scrotum on exam.  Will give pain medication and have emergent US to rule out testicular torsion.  If US is negative, will consider CT scan to rule out other intraabdominal pathologies. Patient with history of right inguinal hernia repair with sudden onset pain and significant swelling to scrotum on exam.  Will give pain medication and have emergent US to rule out testicular torsion.  If US is negative, will consider CT scan to rule out other intraabdominal pathologies. Patient with history of right inguinal hernia repair with sudden onset pain and significant swelling to scrotum on exam.  Will give pain medication and have emergent US to rule out testicular torsion.  If US is negative, will consider CT scan to rule out other intra-abdominal pathologies.

## 2018-09-08 NOTE — ED STATDOCS - GENITOURINARY, MLM
normal... Cannot palpate left testicle because of significant left scrotal tenderness and swelling Non-tender right testicle; Left scrotal swelling unable to palpate left testicle; Left scrotal tenderness

## 2018-09-08 NOTE — ED ADULT NURSE NOTE - OBJECTIVE STATEMENT
54 y/o male presents to the ed c/o lower abdominal pain with radatio nto the testicles that started today. he states that he had inguinal hernia surgery on 8/31 by dr brantley and was doing fine up until the pain started today

## 2018-09-08 NOTE — H&P ADULT - HISTORY OF PRESENT ILLNESS
52 y/o M POD 8 s/p left inguinal hernia repair presents to ED with left groin and scrotal pain and swelling that started today. Patient states for the past week he was recovering well. Today, however, he noticed swelling and pain to left groin and scrotum. Pain worsened with movement. Patient denies trauma to the area. No increased activity. Normal bowel function. No hematuria, dysuria. No nausea or vomiting.

## 2018-09-08 NOTE — ED STATDOCS - SKIN, MLM
skin normal color for race, warm, dry and intact. skin normal color for race, warm, dry.  Wound clean and steri-strips intact.

## 2018-09-08 NOTE — ED STATDOCS - OBJECTIVE STATEMENT
Patient is a 53 year old M with no pertinent PMHx who presents to the ED complaining of 5/10 left sided abdominal pain and testicular swelling that started today.  States that the pain worsens with movement.  Patient was seen here 5 weeks for abdominal pain and it was found to have a hernia.  Had surgery by Dr. Swartz and has been resting and felt fine since.  However, he started with the pain today.  Took Aleve around 9 this morning with no relief.  Denies any urinary symptoms, fevers or other medical complaints at this time. Patient is a 53 year old M with no pertinent PMHx who presents to the ED complaining of 5/10 left sided abdominal pain and testicular swelling that started today.  States that the pain worsens with movement.  Patient was seen here 5 weeks for abdominal pain and it was found to have a hernia.  Had surgery by Dr. Lewis on 8/31 and has been resting and felt fine since.  However, he started with the pain today.  Took Aleve around 9 this morning with no relief.  Denies any urinary symptoms, fevers or other medical complaints at this time. Patient is a 53 year old M with no pertinent PMHx who presents to the ED complaining of 5/10 left sided abdominal pain and testicular swelling that started today.  States that the pain worsens with movement.  Patient was seen here 5 weeks ago for abdominal pain and was found to have a hernia.  Had surgery by Dr. Lewis on 8/31 and has been resting and felt fine since.  However, he started with pain today.  Took Aleve around 9 this morning with no relief.  Denies any urinary symptoms, fevers or other medical complaints at this time.  Has follow up scheduled for this Thursday (9/13). This patient is a 53 year old man with recent hernia repair 8 days ago who presents to the ED complaining of 5/10 left sided groin pain and testicular swelling that started today.  The pain is worse with movement and when he tries to stand or walk the pain increases to 10/10.  Patient states that he has been healing well since after surgery and the swelling was decreasing until he had sudden onset of symptoms today.  He took an aleve today around 9 am with no relief.  He denies dysuria, hematuria, and fever.  Patient has a follow up scheduled for this Thursday (9/13).

## 2018-09-08 NOTE — ED STATDOCS - PROGRESS NOTE DETAILS
PA NOTE: Pt seen by intake physician and HPI/ROS/PE/MDM reviewed. Pt seen and evaluated. Discussed plan and any resulted studies at this time. Will continue to monitor and re-evaluate. I Robert Cameron attest that this documentation has been prepared under the direction and in the presence of Sophy Mars    Multiple attempts made to reach US technician.  They were made aware of the urgency for patient to get exam right away. consulted surgery

## 2018-09-08 NOTE — ED STATDOCS - GASTROINTESTINAL, MLM
suprapubic tenderness, no right inguinal swelling or pain, swelling left inguinal canal with steri strips intact.  Tenderness to the left groin

## 2018-09-08 NOTE — H&P ADULT - ATTENDING COMMENTS
seen examined , avss , tolerated po diet, no n/v, +bm.  abd soft nt nd  left groin : tense +ttp to include the left scrotum. no cellulitis noted  labs imaging reviewed  plan  for OR for left groin exploration and evacuation of hematoma.

## 2018-09-08 NOTE — H&P ADULT - NSHPPHYSICALEXAM_GEN_ALL_CORE
General: NAD, AOx3, comfortable on examination  HEENT: PERRLA, EOMI, moist mucous membranes  Neck: supple, nontender  Cardiovascular: heart RRR, no murmurs  Respiratory: no respiratory distress, CTAB  Abdomen: soft, nontender, nondistended. No guarding or rebound. Normal bowel sounds. Moderate edema to left groin, without surrounding erythema. Left groin incision clean, dry, intact, without drainage. Edema extends into scrotum. Scrotum tense and tender to palpation.   Extremities: no peripheral edema. Normal ROM.  Integumentary: warm, no rash  Neuro: no motor or sensory deficits

## 2018-09-08 NOTE — ED STATDOCS - ATTESTATION, MLM
Yes
I have reviewed and confirmed nurses' notes for patient's medications, allergies, medical history, and surgical history.

## 2018-09-08 NOTE — H&P ADULT - ASSESSMENT
52 y/o M POD 8 s/p left inguinal hernia repair with clinical and radiographic evidence of left groin hematoma, possibly a complication of recent surgery. Patient is hemodynamically stable, afebrile.     Plan:  -Admit to Dr. Priscila CARUSO  -Operative intervention this evening  -NPO/IVF  -Pain control as needed  -Monitor vitals, labs, I/Os  -DVT ppx

## 2018-09-09 ENCOUNTER — RESULT REVIEW (OUTPATIENT)
Age: 53
End: 2018-09-09

## 2018-09-09 LAB
ANION GAP SERPL CALC-SCNC: 9 MMOL/L — SIGNIFICANT CHANGE UP (ref 5–17)
APTT BLD: 38.8 SEC — HIGH (ref 27.5–37.4)
BASOPHILS # BLD AUTO: 0 K/UL — SIGNIFICANT CHANGE UP (ref 0–0.2)
BLD GP AB SCN SERPL QL: SIGNIFICANT CHANGE UP
BUN SERPL-MCNC: 22 MG/DL — HIGH (ref 8–20)
CALCIUM SERPL-MCNC: 9.1 MG/DL — SIGNIFICANT CHANGE UP (ref 8.6–10.2)
CHLORIDE SERPL-SCNC: 99 MMOL/L — SIGNIFICANT CHANGE UP (ref 98–107)
CO2 SERPL-SCNC: 27 MMOL/L — SIGNIFICANT CHANGE UP (ref 22–29)
CREAT SERPL-MCNC: 0.86 MG/DL — SIGNIFICANT CHANGE UP (ref 0.5–1.3)
EOSINOPHIL # BLD AUTO: 0.7 K/UL — HIGH (ref 0–0.5)
EOSINOPHIL NFR BLD AUTO: 3.5 % — SIGNIFICANT CHANGE UP (ref 0–5)
GLUCOSE SERPL-MCNC: 107 MG/DL — SIGNIFICANT CHANGE UP (ref 70–115)
HCT VFR BLD CALC: 41.1 % — LOW (ref 42–52)
HGB BLD-MCNC: 13.2 G/DL — LOW (ref 14–18)
INR BLD: 1.3 RATIO — HIGH (ref 0.88–1.16)
LYMPHOCYTES # BLD AUTO: 2 K/UL — SIGNIFICANT CHANGE UP (ref 1–4.8)
LYMPHOCYTES # BLD AUTO: 9.4 % — LOW (ref 20–55)
MAGNESIUM SERPL-MCNC: 1.8 MG/DL — SIGNIFICANT CHANGE UP (ref 1.6–2.6)
MCHC RBC-ENTMCNC: 30.6 PG — SIGNIFICANT CHANGE UP (ref 27–31)
MCHC RBC-ENTMCNC: 32.1 G/DL — SIGNIFICANT CHANGE UP (ref 32–36)
MCV RBC AUTO: 95.1 FL — HIGH (ref 80–94)
MONOCYTES # BLD AUTO: 1.1 K/UL — HIGH (ref 0–0.8)
MONOCYTES NFR BLD AUTO: 5.2 % — SIGNIFICANT CHANGE UP (ref 3–10)
NEUTROPHILS # BLD AUTO: 16.8 K/UL — HIGH (ref 1.8–8)
NEUTROPHILS NFR BLD AUTO: 81.1 % — HIGH (ref 37–73)
PHOSPHATE SERPL-MCNC: 3 MG/DL — SIGNIFICANT CHANGE UP (ref 2.4–4.7)
PLATELET # BLD AUTO: 305 K/UL — SIGNIFICANT CHANGE UP (ref 150–400)
POTASSIUM SERPL-MCNC: 4.5 MMOL/L — SIGNIFICANT CHANGE UP (ref 3.5–5.3)
POTASSIUM SERPL-SCNC: 4.5 MMOL/L — SIGNIFICANT CHANGE UP (ref 3.5–5.3)
PROTHROM AB SERPL-ACNC: 14.4 SEC — HIGH (ref 9.8–12.7)
RBC # BLD: 4.32 M/UL — LOW (ref 4.6–6.2)
RBC # FLD: 13.6 % — SIGNIFICANT CHANGE UP (ref 11–15.6)
SODIUM SERPL-SCNC: 135 MMOL/L — SIGNIFICANT CHANGE UP (ref 135–145)
WBC # BLD: 20.8 K/UL — HIGH (ref 4.8–10.8)
WBC # FLD AUTO: 20.8 K/UL — HIGH (ref 4.8–10.8)

## 2018-09-09 PROCEDURE — 10140 I&D HMTMA SEROMA/FLUID COLLJ: CPT | Mod: 78

## 2018-09-09 PROCEDURE — 88304 TISSUE EXAM BY PATHOLOGIST: CPT | Mod: 26

## 2018-09-09 RX ORDER — OXYCODONE HYDROCHLORIDE 5 MG/1
5 TABLET ORAL EVERY 6 HOURS
Qty: 0 | Refills: 0 | Status: DISCONTINUED | OUTPATIENT
Start: 2018-09-09 | End: 2018-09-10

## 2018-09-09 RX ORDER — SENNA PLUS 8.6 MG/1
1 TABLET ORAL AT BEDTIME
Qty: 0 | Refills: 0 | Status: DISCONTINUED | OUTPATIENT
Start: 2018-09-09 | End: 2018-09-10

## 2018-09-09 RX ORDER — MORPHINE SULFATE 50 MG/1
2 CAPSULE, EXTENDED RELEASE ORAL EVERY 4 HOURS
Qty: 0 | Refills: 0 | Status: DISCONTINUED | OUTPATIENT
Start: 2018-09-09 | End: 2018-09-09

## 2018-09-09 RX ORDER — ONDANSETRON 8 MG/1
4 TABLET, FILM COATED ORAL EVERY 6 HOURS
Qty: 0 | Refills: 0 | Status: DISCONTINUED | OUTPATIENT
Start: 2018-09-09 | End: 2018-09-10

## 2018-09-09 RX ORDER — HYDROMORPHONE HYDROCHLORIDE 2 MG/ML
0.5 INJECTION INTRAMUSCULAR; INTRAVENOUS; SUBCUTANEOUS
Qty: 0 | Refills: 0 | Status: DISCONTINUED | OUTPATIENT
Start: 2018-09-09 | End: 2018-09-09

## 2018-09-09 RX ORDER — DOCUSATE SODIUM 100 MG
100 CAPSULE ORAL THREE TIMES A DAY
Qty: 0 | Refills: 0 | Status: DISCONTINUED | OUTPATIENT
Start: 2018-09-09 | End: 2018-09-10

## 2018-09-09 RX ORDER — ACETAMINOPHEN 500 MG
650 TABLET ORAL ONCE
Qty: 0 | Refills: 0 | Status: COMPLETED | OUTPATIENT
Start: 2018-09-09 | End: 2018-09-09

## 2018-09-09 RX ORDER — TRAMADOL HYDROCHLORIDE 50 MG/1
50 TABLET ORAL EVERY 6 HOURS
Qty: 0 | Refills: 0 | Status: DISCONTINUED | OUTPATIENT
Start: 2018-09-09 | End: 2018-09-10

## 2018-09-09 RX ORDER — ENOXAPARIN SODIUM 100 MG/ML
40 INJECTION SUBCUTANEOUS DAILY
Qty: 0 | Refills: 0 | Status: DISCONTINUED | OUTPATIENT
Start: 2018-09-09 | End: 2018-09-10

## 2018-09-09 RX ORDER — SODIUM CHLORIDE 9 MG/ML
1000 INJECTION, SOLUTION INTRAVENOUS
Qty: 0 | Refills: 0 | Status: DISCONTINUED | OUTPATIENT
Start: 2018-09-09 | End: 2018-09-09

## 2018-09-09 RX ORDER — ACETAMINOPHEN 500 MG
650 TABLET ORAL EVERY 6 HOURS
Qty: 0 | Refills: 0 | Status: DISCONTINUED | OUTPATIENT
Start: 2018-09-09 | End: 2018-09-10

## 2018-09-09 RX ORDER — MORPHINE SULFATE 50 MG/1
4 CAPSULE, EXTENDED RELEASE ORAL EVERY 4 HOURS
Qty: 0 | Refills: 0 | Status: DISCONTINUED | OUTPATIENT
Start: 2018-09-09 | End: 2018-09-09

## 2018-09-09 RX ORDER — INFLUENZA VIRUS VACCINE 15; 15; 15; 15 UG/.5ML; UG/.5ML; UG/.5ML; UG/.5ML
0.5 SUSPENSION INTRAMUSCULAR ONCE
Qty: 0 | Refills: 0 | Status: DISCONTINUED | OUTPATIENT
Start: 2018-09-09 | End: 2018-09-10

## 2018-09-09 RX ORDER — ONDANSETRON 8 MG/1
4 TABLET, FILM COATED ORAL ONCE
Qty: 0 | Refills: 0 | Status: DISCONTINUED | OUTPATIENT
Start: 2018-09-09 | End: 2018-09-09

## 2018-09-09 RX ADMIN — Medication 650 MILLIGRAM(S): at 15:13

## 2018-09-09 RX ADMIN — SENNA PLUS 1 TABLET(S): 8.6 TABLET ORAL at 21:47

## 2018-09-09 RX ADMIN — Medication 650 MILLIGRAM(S): at 19:24

## 2018-09-09 RX ADMIN — Medication 100 MILLIGRAM(S): at 13:08

## 2018-09-09 RX ADMIN — ENOXAPARIN SODIUM 40 MILLIGRAM(S): 100 INJECTION SUBCUTANEOUS at 12:24

## 2018-09-09 RX ADMIN — Medication 100 MILLIGRAM(S): at 21:47

## 2018-09-09 RX ADMIN — Medication 650 MILLIGRAM(S): at 18:40

## 2018-09-09 RX ADMIN — OXYCODONE HYDROCHLORIDE 5 MILLIGRAM(S): 5 TABLET ORAL at 14:30

## 2018-09-09 RX ADMIN — Medication 650 MILLIGRAM(S): at 14:30

## 2018-09-09 RX ADMIN — ONDANSETRON 4 MILLIGRAM(S): 8 TABLET, FILM COATED ORAL at 14:48

## 2018-09-09 RX ADMIN — OXYCODONE HYDROCHLORIDE 5 MILLIGRAM(S): 5 TABLET ORAL at 13:38

## 2018-09-09 RX ADMIN — Medication 650 MILLIGRAM(S): at 21:47

## 2018-09-09 RX ADMIN — Medication 650 MILLIGRAM(S): at 22:30

## 2018-09-09 NOTE — PROGRESS NOTE ADULT - ASSESSMENT
52 y/o M POD 8 s/p left inguinal hernia repair who presents with L groin hematoma, not s/p evacuation. Doing well    -regular diet  -RELL teaching  -OOB  -pain control  -resume DVT prophylaxis

## 2018-09-09 NOTE — PROGRESS NOTE ADULT - SUBJECTIVE AND OBJECTIVE BOX
S/P evac of hematoma post hernia repair  Feels much better  Dressing dry intact  RELL scant  WBC 20  supportive care

## 2018-09-09 NOTE — BRIEF OPERATIVE NOTE - OPERATION/FINDINGS
Large amount of clots inside the scrotum ~300ml Large amount of clots inside the scrotum ~300ml  Mesh intact  A RELL drain left in the scrotal cavity

## 2018-09-09 NOTE — PROGRESS NOTE ADULT - SUBJECTIVE AND OBJECTIVE BOX
INTERVAL HPI/OVERNIGHT EVENTS: Patient was taken to the OR this AM for evacuation of hematoma of L groin.     SUBJECTIVE: Patient doing well this AM. No N/V, fevers/chills. Pain under control       MEDICATIONS  (STANDING):  docusate sodium 100 milliGRAM(s) Oral three times a day  enoxaparin Injectable 40 milliGRAM(s) SubCutaneous daily  influenza   Vaccine 0.5 milliLiter(s) IntraMuscular once  senna 1 Tablet(s) Oral at bedtime    MEDICATIONS  (PRN):  acetaminophen   Tablet .. 650 milliGRAM(s) Oral every 6 hours PRN Mild Pain (1 - 3)  ondansetron Injectable 4 milliGRAM(s) IV Push every 6 hours PRN Nausea  oxyCODONE    IR 5 milliGRAM(s) Oral every 6 hours PRN Severe Pain (7 - 10)  traMADol 50 milliGRAM(s) Oral every 6 hours PRN Severe Pain (7 - 10)      Vital Signs Last 24 Hrs  T(C): 36.6 (09 Sep 2018 08:02), Max: 36.8 (09 Sep 2018 07:35)  T(F): 97.8 (09 Sep 2018 08:02), Max: 98.2 (09 Sep 2018 07:35)  HR: 88 (09 Sep 2018 08:02) (69 - 88)  BP: 107/72 (09 Sep 2018 08:02) (88/59 - 120/69)  BP(mean): --  RR: 16 (09 Sep 2018 08:02) (14 - 20)  SpO2: 95% (09 Sep 2018 08:02) (94% - 100%)    PE  General: NAD, AOx3  	Neck: supple, nontender  	Respiratory: no respiratory distress  	Abdomen: soft, nontender, nondistended. Dressing over groin hematoma evacuation c/d/i.   	Extremities: no peripheral edema. Normal ROM.  	Integumentary: warm, no rash  Neuro: no motor or sensory deficits      I&O's Detail    08 Sep 2018 07:01  -  09 Sep 2018 07:00  --------------------------------------------------------  IN:    lactated ringers.: 100 mL  Total IN: 100 mL    OUT:    Bulb: 15 mL    Estimated Blood Loss: 10 mL    Voided: 700 mL  Total OUT: 725 mL    Total NET: -625 mL      09 Sep 2018 07:01  -  09 Sep 2018 16:19  --------------------------------------------------------  IN:    lactated ringers.: 100 mL    Oral Fluid: 500 mL  Total IN: 600 mL    OUT:    Bulb: 35 mL    Voided: 750 mL  Total OUT: 785 mL    Total NET: -185 mL          LABS:                        13.2   20.8  )-----------( 305      ( 09 Sep 2018 05:37 )             41.1         135  |  99  |  22.0<H>  ----------------------------<  107  4.5   |  27.0  |  0.86    Ca    9.1      09 Sep 2018 05:37  Phos  3.0       Mg     1.8         TPro  8.3  /  Alb  4.0  /  TBili  1.1  /  DBili  x   /  AST  15  /  ALT  13  /  AlkPhos  67      PT/INR - ( 08 Sep 2018 23:51 )   PT: 14.4 sec;   INR: 1.30 ratio         PTT - ( 08 Sep 2018 23:51 )  PTT:38.8 sec  Urinalysis Basic - ( 08 Sep 2018 14:51 )    Color: Yellow / Appearance: Clear / S.015 / pH: x  Gluc: x / Ketone: Negative  / Bili: Negative / Urobili: 1 mg/dL   Blood: x / Protein: 15 mg/dL / Nitrite: Negative   Leuk Esterase: Trace / RBC: 0-2 /HPF / WBC 3-5   Sq Epi: x / Non Sq Epi: Occasional / Bacteria: x        RADIOLOGY & ADDITIONAL STUDIES:

## 2018-09-09 NOTE — BRIEF OPERATIVE NOTE - PROCEDURE
<<-----Click on this checkbox to enter Procedure Evacuation of hematoma of left groin  09/09/2018    Active  AMPRABHU  Exploration of groin  09/09/2018    Active  REEMA

## 2018-09-10 ENCOUNTER — TRANSCRIPTION ENCOUNTER (OUTPATIENT)
Age: 53
End: 2018-09-10

## 2018-09-10 VITALS
OXYGEN SATURATION: 97 % | DIASTOLIC BLOOD PRESSURE: 61 MMHG | TEMPERATURE: 99 F | RESPIRATION RATE: 18 BRPM | HEART RATE: 74 BPM | SYSTOLIC BLOOD PRESSURE: 105 MMHG

## 2018-09-10 LAB
ANION GAP SERPL CALC-SCNC: 9 MMOL/L — SIGNIFICANT CHANGE UP (ref 5–17)
BUN SERPL-MCNC: 19 MG/DL — SIGNIFICANT CHANGE UP (ref 8–20)
CALCIUM SERPL-MCNC: 9.1 MG/DL — SIGNIFICANT CHANGE UP (ref 8.6–10.2)
CHLORIDE SERPL-SCNC: 102 MMOL/L — SIGNIFICANT CHANGE UP (ref 98–107)
CO2 SERPL-SCNC: 26 MMOL/L — SIGNIFICANT CHANGE UP (ref 22–29)
CREAT SERPL-MCNC: 0.88 MG/DL — SIGNIFICANT CHANGE UP (ref 0.5–1.3)
GLUCOSE SERPL-MCNC: 99 MG/DL — SIGNIFICANT CHANGE UP (ref 70–115)
HCT VFR BLD CALC: 36.2 % — LOW (ref 42–52)
HGB BLD-MCNC: 12 G/DL — LOW (ref 14–18)
MAGNESIUM SERPL-MCNC: 1.9 MG/DL — SIGNIFICANT CHANGE UP (ref 1.6–2.6)
MCHC RBC-ENTMCNC: 30.1 PG — SIGNIFICANT CHANGE UP (ref 27–31)
MCHC RBC-ENTMCNC: 33.1 G/DL — SIGNIFICANT CHANGE UP (ref 32–36)
MCV RBC AUTO: 90.7 FL — SIGNIFICANT CHANGE UP (ref 80–94)
PHOSPHATE SERPL-MCNC: 3.1 MG/DL — SIGNIFICANT CHANGE UP (ref 2.4–4.7)
PLATELET # BLD AUTO: 331 K/UL — SIGNIFICANT CHANGE UP (ref 150–400)
POTASSIUM SERPL-MCNC: 4.4 MMOL/L — SIGNIFICANT CHANGE UP (ref 3.5–5.3)
POTASSIUM SERPL-SCNC: 4.4 MMOL/L — SIGNIFICANT CHANGE UP (ref 3.5–5.3)
RBC # BLD: 3.99 M/UL — LOW (ref 4.6–6.2)
RBC # FLD: 13.4 % — SIGNIFICANT CHANGE UP (ref 11–15.6)
SODIUM SERPL-SCNC: 137 MMOL/L — SIGNIFICANT CHANGE UP (ref 135–145)
WBC # BLD: 18.2 K/UL — HIGH (ref 4.8–10.8)
WBC # FLD AUTO: 18.2 K/UL — HIGH (ref 4.8–10.8)

## 2018-09-10 RX ORDER — TRAMADOL HYDROCHLORIDE 50 MG/1
1 TABLET ORAL
Qty: 12 | Refills: 0
Start: 2018-09-10 | End: 2018-09-12

## 2018-09-10 RX ORDER — ACETAMINOPHEN 500 MG
2 TABLET ORAL
Qty: 0 | Refills: 0 | COMMUNITY
Start: 2018-09-10

## 2018-09-10 RX ORDER — TRAMADOL HYDROCHLORIDE 50 MG/1
1 TABLET ORAL
Qty: 0 | Refills: 0 | COMMUNITY
Start: 2018-09-10

## 2018-09-10 RX ADMIN — TRAMADOL HYDROCHLORIDE 50 MILLIGRAM(S): 50 TABLET ORAL at 18:50

## 2018-09-10 RX ADMIN — ENOXAPARIN SODIUM 40 MILLIGRAM(S): 100 INJECTION SUBCUTANEOUS at 14:26

## 2018-09-10 RX ADMIN — OXYCODONE HYDROCHLORIDE 5 MILLIGRAM(S): 5 TABLET ORAL at 05:06

## 2018-09-10 RX ADMIN — TRAMADOL HYDROCHLORIDE 50 MILLIGRAM(S): 50 TABLET ORAL at 10:16

## 2018-09-10 RX ADMIN — TRAMADOL HYDROCHLORIDE 50 MILLIGRAM(S): 50 TABLET ORAL at 17:51

## 2018-09-10 RX ADMIN — Medication 100 MILLIGRAM(S): at 04:26

## 2018-09-10 RX ADMIN — Medication 100 MILLIGRAM(S): at 14:26

## 2018-09-10 RX ADMIN — TRAMADOL HYDROCHLORIDE 50 MILLIGRAM(S): 50 TABLET ORAL at 09:18

## 2018-09-10 RX ADMIN — OXYCODONE HYDROCHLORIDE 5 MILLIGRAM(S): 5 TABLET ORAL at 04:26

## 2018-09-10 NOTE — DISCHARGE NOTE ADULT - CARE PLAN
Principal Discharge DX:	Inguinal pain, left  Goal:	resolution of bleeding  Assessment and plan of treatment:	You were taken to the OR for a left groin exploration and evacuation of a hematoma. For pain, you can alternate between ibuprofen and tylenol every 6 hours. For severe pain, you can take tramadol. Please remember not to drive, operate heavy machinery, or make important decisions while on narcotics.     Please go to the nearest ED if you experience any of the following symptoms: fever, chills, nausea, vomiting, increasing pain to groin, bleeding, expanding bruising.    Otherwise, please follow up at the [facility] in 10-14 days.  [facility],   [address] [id number] Principal Discharge DX:	Inguinal pain, left  Goal:	resolution of bleeding  Assessment and plan of treatment:	You were taken to the OR for a left groin exploration and evacuation of a hematoma. For pain, you can alternate between ibuprofen and tylenol every 6 hours. For severe pain, you can take tramadol. Please remember not to drive, operate heavy machinery, or make important decisions while on narcotics.     Please go to the nearest ED if you experience any of the following symptoms: fever, chills, nausea, vomiting, increasing pain to groin, bleeding, expanding bruising.    Otherwise, please follow up at the [facility] in 10-14 days.  [facility],   [address] [place] [address]  Goal:	,

## 2018-09-10 NOTE — DISCHARGE NOTE ADULT - MEDICATION SUMMARY - MEDICATIONS TO TAKE
I will START or STAY ON the medications listed below when I get home from the hospital:    acetaminophen 325 mg oral tablet  -- 2 tab(s) by mouth every 6 hours, As needed, Mild Pain (1 - 3)  -- Indication: For pain    traMADol 50 mg oral tablet  -- 1 tab(s) by mouth every 6 hours, As Needed -Severe Pain (7 - 10) - for severe pain MDD:max 4tabs   -- Indication: For Severe pain

## 2018-09-10 NOTE — DISCHARGE NOTE ADULT - PLAN OF CARE
resolution of bleeding You were taken to the OR for a left groin exploration and evacuation of a hematoma. For pain, you can alternate between ibuprofen and tylenol every 6 hours. For severe pain, you can take tramadol. Please remember not to drive, operate heavy machinery, or make important decisions while on narcotics.     Please go to the nearest ED if you experience any of the following symptoms: fever, chills, nausea, vomiting, increasing pain to groin, bleeding, expanding bruising.    Otherwise, please follow up at the [facility] in 10-14 days.  [facility],   [address] [place] [address] ,

## 2018-09-10 NOTE — DISCHARGE NOTE ADULT - PATIENT PORTAL LINK FT
You can access the [facility] Patient Portal, offered by [facility], by registering with the following website: [email or web address]

## 2018-09-10 NOTE — DISCHARGE NOTE ADULT - PROVIDER TOKENS
FREE:[LAST:[[facility]],PHONE:[phone]],FAX:[(   )    -],ADDRESS:[[facility] [place] [address] [place] [address]]]

## 2018-09-10 NOTE — DISCHARGE NOTE ADULT - HOSPITAL COURSE
On 9/8, 54 y/o M POD 8 s/p left inguinal hernia repair presents to ED with left groin and scrotal pain and swelling that started today. Patient states for the past week he was recovering well. Today, however, he noticed swelling and pain to left groin and scrotum. Pain worsened with movement. Patient denies trauma to the area. No increased activity. Normal bowel function. No hematuria, dysuria. No nausea or vomiting.    Patient was taken to the OR for an exploration and evacuation of hematoma on the left groin. There were large amounts of clots ~300mL. a RELL drain was placed with minimal output. On 9/10, it was removed.     On d/c patient is afebrile, VSS, ambulating, and tolerating a regular diet.

## 2018-09-12 LAB — SURGICAL PATHOLOGY FINAL REPORT - CH: SIGNIFICANT CHANGE UP

## 2018-09-13 ENCOUNTER — APPOINTMENT (OUTPATIENT)
Dept: TRAUMA SURGERY | Facility: CLINIC | Age: 53
End: 2018-09-13
Payer: COMMERCIAL

## 2018-09-13 VITALS
HEIGHT: 67 IN | WEIGHT: 128 LBS | OXYGEN SATURATION: 98 % | HEART RATE: 88 BPM | BODY MASS INDEX: 20.09 KG/M2 | DIASTOLIC BLOOD PRESSURE: 74 MMHG | TEMPERATURE: 97.7 F | SYSTOLIC BLOOD PRESSURE: 119 MMHG

## 2018-09-13 PROCEDURE — 99024 POSTOP FOLLOW-UP VISIT: CPT

## 2018-09-18 ENCOUNTER — APPOINTMENT (OUTPATIENT)
Dept: TRAUMA SURGERY | Facility: CLINIC | Age: 53
End: 2018-09-18
Payer: COMMERCIAL

## 2018-09-18 VITALS
WEIGHT: 125 LBS | HEART RATE: 71 BPM | TEMPERATURE: 97.6 F | SYSTOLIC BLOOD PRESSURE: 107 MMHG | DIASTOLIC BLOOD PRESSURE: 65 MMHG | BODY MASS INDEX: 19.58 KG/M2 | OXYGEN SATURATION: 97 %

## 2018-09-18 PROCEDURE — 99024 POSTOP FOLLOW-UP VISIT: CPT

## 2018-09-21 ENCOUNTER — APPOINTMENT (OUTPATIENT)
Dept: TRAUMA SURGERY | Facility: CLINIC | Age: 53
End: 2018-09-21
Payer: COMMERCIAL

## 2018-09-21 VITALS
SYSTOLIC BLOOD PRESSURE: 118 MMHG | TEMPERATURE: 97.9 F | DIASTOLIC BLOOD PRESSURE: 79 MMHG | BODY MASS INDEX: 19.78 KG/M2 | OXYGEN SATURATION: 100 % | HEIGHT: 67 IN | WEIGHT: 126 LBS | HEART RATE: 105 BPM

## 2018-09-21 PROCEDURE — 99024 POSTOP FOLLOW-UP VISIT: CPT

## 2018-09-27 ENCOUNTER — APPOINTMENT (OUTPATIENT)
Dept: TRAUMA SURGERY | Facility: CLINIC | Age: 53
End: 2018-09-27
Payer: COMMERCIAL

## 2018-09-27 VITALS
BODY MASS INDEX: 20.25 KG/M2 | TEMPERATURE: 98 F | HEIGHT: 67 IN | HEART RATE: 76 BPM | OXYGEN SATURATION: 100 % | DIASTOLIC BLOOD PRESSURE: 76 MMHG | SYSTOLIC BLOOD PRESSURE: 123 MMHG | WEIGHT: 129 LBS

## 2018-09-27 PROCEDURE — 99024 POSTOP FOLLOW-UP VISIT: CPT

## 2018-10-30 ENCOUNTER — APPOINTMENT (OUTPATIENT)
Dept: TRAUMA SURGERY | Facility: CLINIC | Age: 53
End: 2018-10-30
Payer: COMMERCIAL

## 2018-10-30 VITALS
HEIGHT: 67 IN | DIASTOLIC BLOOD PRESSURE: 72 MMHG | SYSTOLIC BLOOD PRESSURE: 114 MMHG | HEART RATE: 85 BPM | OXYGEN SATURATION: 99 % | WEIGHT: 129 LBS | TEMPERATURE: 98.1 F | BODY MASS INDEX: 20.25 KG/M2

## 2018-10-30 DIAGNOSIS — K40.90 UNILATERAL INGUINAL HERNIA, W/OUT OBSTRUCTION OR GANGRENE, NOT SPECIFIED AS RECURRENT: ICD-10-CM

## 2018-10-30 DIAGNOSIS — S30.22XA CONTUSION OF SCROTUM AND TESTES, INITIAL ENCOUNTER: ICD-10-CM

## 2018-10-30 PROCEDURE — 99024 POSTOP FOLLOW-UP VISIT: CPT

## 2018-10-31 PROBLEM — K40.90 LEFT INGUINAL HERNIA: Status: ACTIVE | Noted: 2018-08-23

## 2018-10-31 PROBLEM — S30.22XA SCROTAL HEMATOMA: Status: ACTIVE | Noted: 2018-09-14

## 2018-11-27 ENCOUNTER — APPOINTMENT (OUTPATIENT)
Dept: TRAUMA SURGERY | Facility: CLINIC | Age: 53
End: 2018-11-27

## 2018-12-26 PROCEDURE — 99285 EMERGENCY DEPT VISIT HI MDM: CPT | Mod: 25

## 2018-12-26 PROCEDURE — 81001 URINALYSIS AUTO W/SCOPE: CPT

## 2018-12-26 PROCEDURE — 84100 ASSAY OF PHOSPHORUS: CPT

## 2018-12-26 PROCEDURE — 74177 CT ABD & PELVIS W/CONTRAST: CPT

## 2018-12-26 PROCEDURE — 80048 BASIC METABOLIC PNL TOTAL CA: CPT

## 2018-12-26 PROCEDURE — 76870 US EXAM SCROTUM: CPT

## 2018-12-26 PROCEDURE — 36415 COLL VENOUS BLD VENIPUNCTURE: CPT

## 2018-12-26 PROCEDURE — 85610 PROTHROMBIN TIME: CPT

## 2018-12-26 PROCEDURE — 96374 THER/PROPH/DIAG INJ IV PUSH: CPT | Mod: XU

## 2018-12-26 PROCEDURE — 85027 COMPLETE CBC AUTOMATED: CPT

## 2018-12-26 PROCEDURE — 88304 TISSUE EXAM BY PATHOLOGIST: CPT

## 2018-12-26 PROCEDURE — 83735 ASSAY OF MAGNESIUM: CPT

## 2018-12-26 PROCEDURE — 80053 COMPREHEN METABOLIC PANEL: CPT

## 2018-12-26 PROCEDURE — 86900 BLOOD TYPING SEROLOGIC ABO: CPT

## 2018-12-26 PROCEDURE — 86850 RBC ANTIBODY SCREEN: CPT

## 2018-12-26 PROCEDURE — 86901 BLOOD TYPING SEROLOGIC RH(D): CPT

## 2018-12-26 PROCEDURE — 85730 THROMBOPLASTIN TIME PARTIAL: CPT

## 2019-08-02 ENCOUNTER — EMERGENCY (EMERGENCY)
Facility: HOSPITAL | Age: 54
LOS: 1 days | Discharge: HOME | End: 2019-08-02
Attending: EMERGENCY MEDICINE
Payer: COMMERCIAL

## 2019-08-02 VITALS
TEMPERATURE: 98 F | WEIGHT: 134.92 LBS | SYSTOLIC BLOOD PRESSURE: 104 MMHG | HEIGHT: 67 IN | OXYGEN SATURATION: 98 % | DIASTOLIC BLOOD PRESSURE: 73 MMHG | HEART RATE: 69 BPM | RESPIRATION RATE: 20 BRPM

## 2019-08-02 DIAGNOSIS — Z98.890 OTHER SPECIFIED POSTPROCEDURAL STATES: Chronic | ICD-10-CM

## 2019-08-02 PROCEDURE — 99285 EMERGENCY DEPT VISIT HI MDM: CPT

## 2019-08-02 RX ORDER — SODIUM CHLORIDE 9 MG/ML
1000 INJECTION INTRAMUSCULAR; INTRAVENOUS; SUBCUTANEOUS ONCE
Refills: 0 | Status: COMPLETED | OUTPATIENT
Start: 2019-08-02 | End: 2019-08-02

## 2019-08-02 RX ORDER — ONDANSETRON 8 MG/1
4 TABLET, FILM COATED ORAL ONCE
Refills: 0 | Status: COMPLETED | OUTPATIENT
Start: 2019-08-02 | End: 2019-08-03

## 2019-08-02 RX ORDER — FAMOTIDINE 10 MG/ML
20 INJECTION INTRAVENOUS ONCE
Refills: 0 | Status: COMPLETED | OUTPATIENT
Start: 2019-08-02 | End: 2019-08-02

## 2019-08-02 NOTE — ED ADULT TRIAGE NOTE - CHIEF COMPLAINT QUOTE
c/o of generalized intermittent squeezing non radiating abd pain x2weeks with worsening today  and weakness with associated nausea, denies fever

## 2019-08-02 NOTE — ED STATDOCS - ATTENDING CONTRIBUTION TO CARE
I, Rosetta Galdamez, performed the initial face to face bedside interview with this patient regarding history of present illness, review of symptoms and relevant past medical, social and family history.  I completed an independent physical examination.  I was the initial provider who evaluated this patient. I have signed out the follow up of any pending tests (i.e. labs, radiological studies) to the ACP.  I have communicated the patient’s plan of care and disposition with the ACP.  The history, relevant review of systems, past medical and surgical history, medical decision making, and physical examination was documented by the scribe in my presence and I attest to the accuracy of the documentation.

## 2019-08-02 NOTE — ED STATDOCS - CLINICAL SUMMARY MEDICAL DECISION MAKING FREE TEXT BOX
abdominal pain after eating food yesterday daniel could have been fried , greasy or spicey. tender ruq. labs iv iv meds us reassess

## 2019-08-02 NOTE — ED STATDOCS - PROGRESS NOTE DETAILS
PA note: PT evaluated by intake physician. HPI/PE/ROS as noted above. Will follow up plan per intake physician US negative. VSS, pt resting comfortably in stretcher, US negative. VSS, pt resting comfortably in stretcher, labs wnl. Abdomen: +BS x 4, soft, non-tender, non-distended, no rebound or guarding. Pt provided with copy of all results. Return precautions discussed. Pt stable for dc at this time.

## 2019-08-02 NOTE — ED STATDOCS - OBJECTIVE STATEMENT
53 y/o M pt presents to ED c/o RUQ abdominal pain that began this morning. Pt describes his pain as intermittent cramping. Pt says that he has experienced similar Sx before and felt this particular episode coming on. However his past episodes usually resolved on their own. Pt says that his episodes usually occur after he eats greasy food. Denies ear aches, coughing, CP, N/V/D, fever, chills recent sick contacts. Also denies FHx of kidney stones, cholecystitis, cholelithiases. No further complaints at this time.

## 2019-08-03 ENCOUNTER — EMERGENCY (EMERGENCY)
Facility: HOSPITAL | Age: 54
LOS: 1 days | Discharge: HOME | End: 2019-08-03
Attending: EMERGENCY MEDICINE
Payer: COMMERCIAL

## 2019-08-03 VITALS
DIASTOLIC BLOOD PRESSURE: 73 MMHG | HEART RATE: 80 BPM | SYSTOLIC BLOOD PRESSURE: 114 MMHG | WEIGHT: 134.92 LBS | OXYGEN SATURATION: 97 % | TEMPERATURE: 99 F | RESPIRATION RATE: 18 BRPM | HEIGHT: 67 IN

## 2019-08-03 VITALS
TEMPERATURE: 99 F | OXYGEN SATURATION: 98 % | SYSTOLIC BLOOD PRESSURE: 107 MMHG | DIASTOLIC BLOOD PRESSURE: 72 MMHG | HEART RATE: 79 BPM | RESPIRATION RATE: 18 BRPM

## 2019-08-03 DIAGNOSIS — Z98.890 OTHER SPECIFIED POSTPROCEDURAL STATES: Chronic | ICD-10-CM

## 2019-08-03 LAB
ALBUMIN SERPL ELPH-MCNC: 4.2 G/DL — SIGNIFICANT CHANGE UP (ref 3.3–5.2)
ALP SERPL-CCNC: 68 U/L — SIGNIFICANT CHANGE UP (ref 40–120)
ALT FLD-CCNC: 11 U/L — SIGNIFICANT CHANGE UP
ANION GAP SERPL CALC-SCNC: 11 MMOL/L — SIGNIFICANT CHANGE UP (ref 5–17)
AST SERPL-CCNC: 15 U/L — SIGNIFICANT CHANGE UP
BASOPHILS # BLD AUTO: 0.04 K/UL — SIGNIFICANT CHANGE UP (ref 0–0.2)
BASOPHILS NFR BLD AUTO: 0.4 % — SIGNIFICANT CHANGE UP (ref 0–2)
BILIRUB SERPL-MCNC: 1 MG/DL — SIGNIFICANT CHANGE UP (ref 0.4–2)
BUN SERPL-MCNC: 12 MG/DL — SIGNIFICANT CHANGE UP (ref 8–20)
CALCIUM SERPL-MCNC: 10.2 MG/DL — SIGNIFICANT CHANGE UP (ref 8.6–10.2)
CHLORIDE SERPL-SCNC: 101 MMOL/L — SIGNIFICANT CHANGE UP (ref 98–107)
CO2 SERPL-SCNC: 25 MMOL/L — SIGNIFICANT CHANGE UP (ref 22–29)
CREAT SERPL-MCNC: 0.87 MG/DL — SIGNIFICANT CHANGE UP (ref 0.5–1.3)
EOSINOPHIL # BLD AUTO: 0.25 K/UL — SIGNIFICANT CHANGE UP (ref 0–0.5)
EOSINOPHIL NFR BLD AUTO: 2.4 % — SIGNIFICANT CHANGE UP (ref 0–6)
GLUCOSE SERPL-MCNC: 109 MG/DL — SIGNIFICANT CHANGE UP (ref 70–115)
HCT VFR BLD CALC: 44.3 % — SIGNIFICANT CHANGE UP (ref 39–50)
HGB BLD-MCNC: 15.2 G/DL — SIGNIFICANT CHANGE UP (ref 13–17)
IMM GRANULOCYTES NFR BLD AUTO: 0.3 % — SIGNIFICANT CHANGE UP (ref 0–1.5)
LIDOCAIN IGE QN: 39 U/L — SIGNIFICANT CHANGE UP (ref 22–51)
LYMPHOCYTES # BLD AUTO: 2.68 K/UL — SIGNIFICANT CHANGE UP (ref 1–3.3)
LYMPHOCYTES # BLD AUTO: 25.7 % — SIGNIFICANT CHANGE UP (ref 13–44)
MCHC RBC-ENTMCNC: 31.1 PG — SIGNIFICANT CHANGE UP (ref 27–34)
MCHC RBC-ENTMCNC: 34.3 GM/DL — SIGNIFICANT CHANGE UP (ref 32–36)
MCV RBC AUTO: 90.8 FL — SIGNIFICANT CHANGE UP (ref 80–100)
MONOCYTES # BLD AUTO: 1.17 K/UL — HIGH (ref 0–0.9)
MONOCYTES NFR BLD AUTO: 11.2 % — SIGNIFICANT CHANGE UP (ref 2–14)
NEUTROPHILS # BLD AUTO: 6.27 K/UL — SIGNIFICANT CHANGE UP (ref 1.8–7.4)
NEUTROPHILS NFR BLD AUTO: 60 % — SIGNIFICANT CHANGE UP (ref 43–77)
PLATELET # BLD AUTO: 240 K/UL — SIGNIFICANT CHANGE UP (ref 150–400)
POTASSIUM SERPL-MCNC: 4 MMOL/L — SIGNIFICANT CHANGE UP (ref 3.5–5.3)
POTASSIUM SERPL-SCNC: 4 MMOL/L — SIGNIFICANT CHANGE UP (ref 3.5–5.3)
PROT SERPL-MCNC: 7.6 G/DL — SIGNIFICANT CHANGE UP (ref 6.6–8.7)
RBC # BLD: 4.88 M/UL — SIGNIFICANT CHANGE UP (ref 4.2–5.8)
RBC # FLD: 13.4 % — SIGNIFICANT CHANGE UP (ref 10.3–14.5)
SODIUM SERPL-SCNC: 137 MMOL/L — SIGNIFICANT CHANGE UP (ref 135–145)
WBC # BLD: 10.44 K/UL — SIGNIFICANT CHANGE UP (ref 3.8–10.5)
WBC # FLD AUTO: 10.44 K/UL — SIGNIFICANT CHANGE UP (ref 3.8–10.5)

## 2019-08-03 PROCEDURE — 96375 TX/PRO/DX INJ NEW DRUG ADDON: CPT

## 2019-08-03 PROCEDURE — 83690 ASSAY OF LIPASE: CPT

## 2019-08-03 PROCEDURE — 99284 EMERGENCY DEPT VISIT MOD MDM: CPT

## 2019-08-03 PROCEDURE — 99284 EMERGENCY DEPT VISIT MOD MDM: CPT | Mod: 25

## 2019-08-03 PROCEDURE — 85027 COMPLETE CBC AUTOMATED: CPT

## 2019-08-03 PROCEDURE — 76705 ECHO EXAM OF ABDOMEN: CPT

## 2019-08-03 PROCEDURE — 36415 COLL VENOUS BLD VENIPUNCTURE: CPT

## 2019-08-03 PROCEDURE — 96374 THER/PROPH/DIAG INJ IV PUSH: CPT

## 2019-08-03 PROCEDURE — 76705 ECHO EXAM OF ABDOMEN: CPT | Mod: 26

## 2019-08-03 PROCEDURE — 80053 COMPREHEN METABOLIC PANEL: CPT

## 2019-08-03 RX ADMIN — SODIUM CHLORIDE 1000 MILLILITER(S): 9 INJECTION INTRAMUSCULAR; INTRAVENOUS; SUBCUTANEOUS at 00:25

## 2019-08-03 RX ADMIN — FAMOTIDINE 20 MILLIGRAM(S): 10 INJECTION INTRAVENOUS at 00:25

## 2019-08-03 RX ADMIN — ONDANSETRON 4 MILLIGRAM(S): 8 TABLET, FILM COATED ORAL at 00:25

## 2019-08-03 NOTE — ED ADULT NURSE NOTE - OBJECTIVE STATEMENT
Pt care assumed at 2313, in no apparent distress at this time. Pt c/o lower abdominal pain, pt reports he was d/c this morning. Pt rates pain 8/10, describes pain as tight cramping. Pt unsure of last bowel movement. Pt tender to lower abdominal palpation. Pt reports one episode of vomiting. Pt has decreased appetite. HR is normal, lung sounds are clear b/l, abd is soft and tender with positive bowel sounds in all four quadrants.

## 2019-08-04 VITALS
SYSTOLIC BLOOD PRESSURE: 104 MMHG | TEMPERATURE: 98 F | OXYGEN SATURATION: 100 % | DIASTOLIC BLOOD PRESSURE: 64 MMHG | RESPIRATION RATE: 16 BRPM | HEART RATE: 64 BPM

## 2019-08-04 LAB
ALBUMIN SERPL ELPH-MCNC: 4 G/DL — SIGNIFICANT CHANGE UP (ref 3.3–5.2)
ALP SERPL-CCNC: 65 U/L — SIGNIFICANT CHANGE UP (ref 40–120)
ALT FLD-CCNC: 10 U/L — SIGNIFICANT CHANGE UP
ANION GAP SERPL CALC-SCNC: 11 MMOL/L — SIGNIFICANT CHANGE UP (ref 5–17)
AST SERPL-CCNC: 16 U/L — SIGNIFICANT CHANGE UP
BASOPHILS # BLD AUTO: 0.03 K/UL — SIGNIFICANT CHANGE UP (ref 0–0.2)
BASOPHILS NFR BLD AUTO: 0.3 % — SIGNIFICANT CHANGE UP (ref 0–2)
BILIRUB SERPL-MCNC: 1.4 MG/DL — SIGNIFICANT CHANGE UP (ref 0.4–2)
BUN SERPL-MCNC: 14 MG/DL — SIGNIFICANT CHANGE UP (ref 8–20)
CALCIUM SERPL-MCNC: 9.6 MG/DL — SIGNIFICANT CHANGE UP (ref 8.6–10.2)
CHLORIDE SERPL-SCNC: 102 MMOL/L — SIGNIFICANT CHANGE UP (ref 98–107)
CO2 SERPL-SCNC: 24 MMOL/L — SIGNIFICANT CHANGE UP (ref 22–29)
CREAT SERPL-MCNC: 0.87 MG/DL — SIGNIFICANT CHANGE UP (ref 0.5–1.3)
EOSINOPHIL # BLD AUTO: 0.15 K/UL — SIGNIFICANT CHANGE UP (ref 0–0.5)
EOSINOPHIL NFR BLD AUTO: 1.3 % — SIGNIFICANT CHANGE UP (ref 0–6)
GLUCOSE SERPL-MCNC: 90 MG/DL — SIGNIFICANT CHANGE UP (ref 70–115)
HCT VFR BLD CALC: 46.6 % — SIGNIFICANT CHANGE UP (ref 39–50)
HGB BLD-MCNC: 15.5 G/DL — SIGNIFICANT CHANGE UP (ref 13–17)
IMM GRANULOCYTES NFR BLD AUTO: 0.5 % — SIGNIFICANT CHANGE UP (ref 0–1.5)
LIDOCAIN IGE QN: 18 U/L — LOW (ref 22–51)
LYMPHOCYTES # BLD AUTO: 17.5 % — SIGNIFICANT CHANGE UP (ref 13–44)
LYMPHOCYTES # BLD AUTO: 2.09 K/UL — SIGNIFICANT CHANGE UP (ref 1–3.3)
MCHC RBC-ENTMCNC: 30.9 PG — SIGNIFICANT CHANGE UP (ref 27–34)
MCHC RBC-ENTMCNC: 33.3 GM/DL — SIGNIFICANT CHANGE UP (ref 32–36)
MCV RBC AUTO: 93 FL — SIGNIFICANT CHANGE UP (ref 80–100)
MONOCYTES # BLD AUTO: 1.3 K/UL — HIGH (ref 0–0.9)
MONOCYTES NFR BLD AUTO: 10.9 % — SIGNIFICANT CHANGE UP (ref 2–14)
NEUTROPHILS # BLD AUTO: 8.32 K/UL — HIGH (ref 1.8–7.4)
NEUTROPHILS NFR BLD AUTO: 69.5 % — SIGNIFICANT CHANGE UP (ref 43–77)
PLATELET # BLD AUTO: 252 K/UL — SIGNIFICANT CHANGE UP (ref 150–400)
POTASSIUM SERPL-MCNC: 4 MMOL/L — SIGNIFICANT CHANGE UP (ref 3.5–5.3)
POTASSIUM SERPL-SCNC: 4 MMOL/L — SIGNIFICANT CHANGE UP (ref 3.5–5.3)
PROT SERPL-MCNC: 7.3 G/DL — SIGNIFICANT CHANGE UP (ref 6.6–8.7)
RBC # BLD: 5.01 M/UL — SIGNIFICANT CHANGE UP (ref 4.2–5.8)
RBC # FLD: 13.6 % — SIGNIFICANT CHANGE UP (ref 10.3–14.5)
SODIUM SERPL-SCNC: 137 MMOL/L — SIGNIFICANT CHANGE UP (ref 135–145)
WBC # BLD: 11.95 K/UL — HIGH (ref 3.8–10.5)
WBC # FLD AUTO: 11.95 K/UL — HIGH (ref 3.8–10.5)

## 2019-08-04 PROCEDURE — 80053 COMPREHEN METABOLIC PANEL: CPT

## 2019-08-04 PROCEDURE — 83690 ASSAY OF LIPASE: CPT

## 2019-08-04 PROCEDURE — 85027 COMPLETE CBC AUTOMATED: CPT

## 2019-08-04 PROCEDURE — 83605 ASSAY OF LACTIC ACID: CPT

## 2019-08-04 PROCEDURE — 99284 EMERGENCY DEPT VISIT MOD MDM: CPT

## 2019-08-04 PROCEDURE — 74177 CT ABD & PELVIS W/CONTRAST: CPT | Mod: 26

## 2019-08-04 PROCEDURE — 36415 COLL VENOUS BLD VENIPUNCTURE: CPT

## 2019-08-04 PROCEDURE — 74177 CT ABD & PELVIS W/CONTRAST: CPT

## 2019-08-04 PROCEDURE — 99281 EMR DPT VST MAYX REQ PHY/QHP: CPT

## 2019-08-04 RX ORDER — FAMOTIDINE 10 MG/ML
20 INJECTION INTRAVENOUS ONCE
Refills: 0 | Status: COMPLETED | OUTPATIENT
Start: 2019-08-04 | End: 2019-08-04

## 2019-08-04 RX ORDER — SODIUM CHLORIDE 9 MG/ML
1000 INJECTION INTRAMUSCULAR; INTRAVENOUS; SUBCUTANEOUS ONCE
Refills: 0 | Status: COMPLETED | OUTPATIENT
Start: 2019-08-04 | End: 2019-08-04

## 2019-08-04 RX ADMIN — Medication 30 MILLILITER(S): at 00:47

## 2019-08-04 RX ADMIN — FAMOTIDINE 20 MILLIGRAM(S): 10 INJECTION INTRAVENOUS at 00:48

## 2019-08-04 RX ADMIN — SODIUM CHLORIDE 1000 MILLILITER(S): 9 INJECTION INTRAMUSCULAR; INTRAVENOUS; SUBCUTANEOUS at 00:48

## 2019-08-04 NOTE — CONSULT NOTE ADULT - SUBJECTIVE AND OBJECTIVE BOX
ACUTE CARE SURGERY CONSULT    Consulting surgical team: [facility]  Consulting attending: Dr. Zamarripa  Patient seen and examined: 08-04-19 @ 06:53    HPI:  53yo male presents to the ED with complaint of lower abdominal pain, crampy in nature, no relieving or aggravating factors. Patient reports similar pain 1 week ago that resolved after drinking louie-seltzer. Yesterday also had similar pain, presented to the ED worked up and was discharged. Today returns with similar complaint, he reports yesterday took MagCitrate and vomited 3 hours later clear fluid. He has associated nausea. Last bowel movement was 2 days ago.     PAST MEDICAL HISTORY:  No pertinent past medical history      PAST SURGICAL HISTORY:  S/P left inguinal hernia repair  s/p Left groin hematoma evacuation      ALLERGIES:  Milk (Rash; Urticaria)  No Known Drug Allergies      FAMILY HISTORY:    SOCIAL HISTORY:    HOME MEDICATIONS:    MEDICATIONS  (STANDING):    MEDICATIONS  (PRN):      VITALS & I/Os:  Vital Signs Last 24 Hrs  T(C): 36.7 (04 Aug 2019 06:03), Max: 37.1 (03 Aug 2019 21:38)  T(F): 98.1 (04 Aug 2019 06:03), Max: 98.7 (03 Aug 2019 21:38)  HR: 64 (04 Aug 2019 06:03) (64 - 80)  BP: 104/64 (04 Aug 2019 06:03) (104/64 - 114/73)  BP(mean): --  RR: 16 (04 Aug 2019 06:03) (16 - 18)  SpO2: 100% (04 Aug 2019 06:03) (97% - 100%)  CAPILLARY BLOOD GLUCOSE          I&O's Summary      GENERAL: Alert, well developed, in no acute distress.  MENTAL STATUS: AAOx3. Appropriate affect.  HEENT: PERRLA. EOMI. MMM.  Trachea midline.   RESPIRATORY: CTAB. No wheezing, rales or rhonchi.  CARDIOVASCULAR: RRR. No audible murmurs, rubs or gallops.   GASTROINTESTINAL: Abdomen soft, minimally tender to lower quadrats, non distended, no rebound or guarding  NEUROLOGIC: Cranial nerves II-XII grossly intact. No focal neurological deficits. Moves all extremities spontaneously. Sensation intact bilaterally.  MUSCULOSKELETAL: No cyanosis or clubbing. No gross deformities.       LABS:                        15.5   11.95 )-----------( 252      ( 04 Aug 2019 01:23 )             46.6     08-04    137  |  102  |  14.0  ----------------------------<  90  4.0   |  24.0  |  0.87    Ca    9.6      04 Aug 2019 01:23    TPro  7.3  /  Alb  4.0  /  TBili  1.4  /  DBili  x   /  AST  16  /  ALT  10  /  AlkPhos  65  08-04    Lactate:            08-04 @ 02:17  1.0        IMAGING:  < from: CT Abdomen and Pelvis w/ Oral Cont and w/ IV Cont (08.04.19 @ 03:12) >     EXAM:  CT ABDOMEN AND PELVIS OC IC                          PROCEDURE DATE:  08/04/2019          INTERPRETATION:  CLINICAL INFORMATION: Generalized abdominal pain and   nausea, vomiting for 2 days    COMPARISON: None.    PROCEDURE:   CT of the Abdomen and Pelvis was performed with intravenous contrast.   Intravenous contrast: 90 ml Omnipaque 350. 10 ml discarded.  Oral contrast: positive contrast was administered.  Sagittal and coronal reformats were performed.    FINDINGS:    LOWER CHEST: Within normal limits.    LIVER: There is a focal hypodense area in the lateral segment of the left   lobe (3-38, 5-74). A tiny cyst in the hepatic dome.  BILE DUCTS: Normal caliber.  GALLBLADDER: Within normal limits.  SPLEEN: Within normal limits.  PANCREAS: Within normal limits.  ADRENALS: Within normal limits.  KIDNEYS/URETERS: Subcentimeter left renal cysts. No hydronephrosis or   urolithiasis.    BLADDER: Within normal limits.  REPRODUCTIVE ORGANS: Mild prostatic enlargement.    BOWEL: There aredilated small bowel loops in the mid abdomen with smooth   transition (4-52). There is short segmental wall thickening of the small   bowel (3-94). Appendix is normal.  PERITONEUM: Trace pelvic ascites  VESSELS: Within normal limits.  RETROPERITONEUM/LYMPH NODES: No lymphadenopathy.    ABDOMINAL WALL: Within normal limits.  BONES: Within normal limits.    IMPRESSION:     Dilated small bowel loops with a smooth transition with short segmental   small bowel wall thickening as described. This may indicate   developing/early small bowel obstruction or enteritis with ileus.                    EDITH CISNEROS M.D., ATTENDING RADIOLOGIST  This document has been electronically signed. Aug  4 2019  3:49AM                  < end of copied text >

## 2019-08-04 NOTE — ED PROVIDER NOTE - PROGRESS NOTE DETAILS
PA NOTE: CT shows Dilated small bowel loops with a smooth transition with short segmental   small bowel wall thickening as described. This may indicate   developing/early small bowel obstruction or enteritis with ileus. Surgery team was consulted PA NOTE: Pt evaluated by Surgery, pt wants to go home, toelrating PO, pt was able to have a bowel movement, agreed with surgery that pt with return if symptoms worsen, Pt reassessed, pt feeling better at this time, pain has improved, vss, pt able to walk, talk and vocalized plan of action. Discussed in depth the results and findings that were performed in the ED and explained to pt in depth the next steps that need to be taking including any medications and proper follow up with PCP or specialists. Pt verbalized their concerns and all questions were answered. Pt understands dispo and agrees with discharge.

## 2019-08-04 NOTE — ED PROVIDER NOTE - CPE EDP SKIN NORM
Prescription faxed via computer as listed on medical record.  Patient needs to make an appointment with physician before any additional refills.     normal...

## 2019-08-04 NOTE — ED PROVIDER NOTE - OBJECTIVE STATEMENT
53 y/o male with history of abdominal hernia repair presents to the ED c/o generalized abdominal pain x 2 weeks, worsening tonight with 1 episode of nb/nb vomiting. Pt was seen and evaluated yesterday at [facility], pt labs wnl, ruq u/s negative, pt was d/c'd home. PT notes he attempted to take mag citrate without relief of pain. Pt notes he has not been passing gas and has not had BM in "a few days, I cant remember when was the last one". Pt states abdominal pain is cramping in nature, can be 10/10 in intensity at times, No relief of the pain. Pt with hx of abdominal hernia repair. No fevers, chills, chest pain, shortness of breath, lightheadedness, dizziness, urinary symptoms, recent travel, diarrhea.

## 2019-08-04 NOTE — CONSULT NOTE ADULT - ASSESSMENT
53yo male with generalized abdominal pain with CT findings with concern for SBO, but PO contrast seen in right colon makes diagnosis unlikely. Case discussed with Dr. Zamarripa, will update recommendations shortly. 53yo male with generalized abdominal pain with CT findings with concern for SBO, but PO contrast seen in right colon makes diagnosis unlikely.   -No acute surgical intervention at this time  -Recommend PO challenge   -If tolerates PO diet, dispo per ED

## 2019-08-04 NOTE — ED PROVIDER NOTE - ATTENDING CONTRIBUTION TO CARE
I, Teofilo Aguilera, have personally performed a face to face diagnostic evaluation on this patient. I have reviewed the ACP note and agree with the history, exam and plan of care, except as noted.    53 yo M hx of hernia repair p/w abdominal pain x 2 weeks associated with 1 episode of nausea and vomiting. Patient was seen in the ED just 1 day prior with unremarkable blood up and US gall bladder. Patient has diffuse abdominal pain. Repeat blood unremarkable. CT abdomen/pelvis showed SBO vs enteritis illus. Surgery consulted, not likely SBO. Patient symptoms improved and tolerated PO. Close return precaution given.

## 2019-08-04 NOTE — CONSULT NOTE ADULT - ATTENDING COMMENTS
54M with diffuse abdominal pain associ with N/V and no BMs since THursday. Surgical consult requested after ED obtained CT scan which stated possible early obstruction vs enteritis ileus.  Patient at time of examination reports improved abdominal pain without nausea/ vomiting and feeling hungry. Still reports no flatus or BMs since CT scan.  denies sick contacts. denies family or personal history of inflammatory bowel disease. no hx of colonosocpy.  Afebrile  VSS  NAD  abdomen is soft, ND, NT, no guarding, no rebound    CT segment of small bowel with thickened wall. contrast goes through all the way to colon.    Discussed with patient that etiology of abdominal pain is unclear: could be viral enteritis vs inflammatory and with contrast in colon, decreased concern for obstruction. No acute surgical intervention warranted but recommended further observation for serial exams.  Patient and his wife state that they live very close by and would prefer to go home to observe. Patient and wife seem reliable. PO challenge and if able to drink without difficulty, discharge to home with instructions to return if pain returns.

## 2019-08-04 NOTE — ED PROVIDER NOTE - CLINICAL SUMMARY MEDICAL DECISION MAKING FREE TEXT BOX
53 y/o male with history of abdominal hernia repair presents to the ED c/o generalized abdominal pain x 2 weeks, worsening tonight with 1 episode of nb/nb vomiting. generalized abdominal pain, with one episode of nb/nb vomiting, will obtain labs, ct of abdomen and pelvis and reassess

## 2019-08-05 ENCOUNTER — TRANSCRIPTION ENCOUNTER (OUTPATIENT)
Age: 54
End: 2019-08-05

## 2019-08-06 ENCOUNTER — INPATIENT (INPATIENT)
Facility: HOSPITAL | Age: 54
LOS: 2 days | Discharge: HOME | DRG: 337 | End: 2019-08-09
Attending: SURGERY | Admitting: SURGERY
Payer: COMMERCIAL

## 2019-08-06 VITALS
WEIGHT: 134.92 LBS | SYSTOLIC BLOOD PRESSURE: 104 MMHG | OXYGEN SATURATION: 99 % | TEMPERATURE: 98 F | RESPIRATION RATE: 20 BRPM | HEART RATE: 70 BPM | DIASTOLIC BLOOD PRESSURE: 69 MMHG | HEIGHT: 67 IN

## 2019-08-06 DIAGNOSIS — K56.609 UNSPECIFIED INTESTINAL OBSTRUCTION, UNSPECIFIED AS TO PARTIAL VERSUS COMPLETE OBSTRUCTION: ICD-10-CM

## 2019-08-06 DIAGNOSIS — Z98.890 OTHER SPECIFIED POSTPROCEDURAL STATES: Chronic | ICD-10-CM

## 2019-08-06 LAB
ALBUMIN SERPL ELPH-MCNC: 4.2 G/DL — SIGNIFICANT CHANGE UP (ref 3.3–5.2)
ALP SERPL-CCNC: 59 U/L — SIGNIFICANT CHANGE UP (ref 40–120)
ALT FLD-CCNC: 9 U/L — SIGNIFICANT CHANGE UP
ANION GAP SERPL CALC-SCNC: 10 MMOL/L — SIGNIFICANT CHANGE UP (ref 5–17)
APPEARANCE UR: CLEAR — SIGNIFICANT CHANGE UP
AST SERPL-CCNC: 17 U/L — SIGNIFICANT CHANGE UP
BACTERIA # UR AUTO: ABNORMAL
BASOPHILS # BLD AUTO: 0.04 K/UL — SIGNIFICANT CHANGE UP (ref 0–0.2)
BASOPHILS NFR BLD AUTO: 0.5 % — SIGNIFICANT CHANGE UP (ref 0–2)
BILIRUB SERPL-MCNC: 0.5 MG/DL — SIGNIFICANT CHANGE UP (ref 0.4–2)
BILIRUB UR-MCNC: NEGATIVE — SIGNIFICANT CHANGE UP
BUN SERPL-MCNC: 9 MG/DL — SIGNIFICANT CHANGE UP (ref 8–20)
CALCIUM SERPL-MCNC: 9.7 MG/DL — SIGNIFICANT CHANGE UP (ref 8.6–10.2)
CHLORIDE SERPL-SCNC: 102 MMOL/L — SIGNIFICANT CHANGE UP (ref 98–107)
CO2 SERPL-SCNC: 28 MMOL/L — SIGNIFICANT CHANGE UP (ref 22–29)
COLOR SPEC: YELLOW — SIGNIFICANT CHANGE UP
COMMENT - URINE: SIGNIFICANT CHANGE UP
CREAT SERPL-MCNC: 0.85 MG/DL — SIGNIFICANT CHANGE UP (ref 0.5–1.3)
DIFF PNL FLD: ABNORMAL
EOSINOPHIL # BLD AUTO: 0.37 K/UL — SIGNIFICANT CHANGE UP (ref 0–0.5)
EOSINOPHIL NFR BLD AUTO: 4.5 % — SIGNIFICANT CHANGE UP (ref 0–6)
EPI CELLS # UR: SIGNIFICANT CHANGE UP
GLUCOSE SERPL-MCNC: 104 MG/DL — SIGNIFICANT CHANGE UP (ref 70–115)
GLUCOSE UR QL: NEGATIVE MG/DL — SIGNIFICANT CHANGE UP
HCT VFR BLD CALC: 45 % — SIGNIFICANT CHANGE UP (ref 39–50)
HGB BLD-MCNC: 14.8 G/DL — SIGNIFICANT CHANGE UP (ref 13–17)
IMM GRANULOCYTES NFR BLD AUTO: 0.2 % — SIGNIFICANT CHANGE UP (ref 0–1.5)
KETONES UR-MCNC: NEGATIVE — SIGNIFICANT CHANGE UP
LACTATE BLDV-MCNC: 1.2 MMOL/L — SIGNIFICANT CHANGE UP (ref 0.5–2)
LEUKOCYTE ESTERASE UR-ACNC: NEGATIVE — SIGNIFICANT CHANGE UP
LIDOCAIN IGE QN: 20 U/L — LOW (ref 22–51)
LYMPHOCYTES # BLD AUTO: 2.46 K/UL — SIGNIFICANT CHANGE UP (ref 1–3.3)
LYMPHOCYTES # BLD AUTO: 29.8 % — SIGNIFICANT CHANGE UP (ref 13–44)
MCHC RBC-ENTMCNC: 31 PG — SIGNIFICANT CHANGE UP (ref 27–34)
MCHC RBC-ENTMCNC: 32.9 GM/DL — SIGNIFICANT CHANGE UP (ref 32–36)
MCV RBC AUTO: 94.3 FL — SIGNIFICANT CHANGE UP (ref 80–100)
MONOCYTES # BLD AUTO: 0.93 K/UL — HIGH (ref 0–0.9)
MONOCYTES NFR BLD AUTO: 11.3 % — SIGNIFICANT CHANGE UP (ref 2–14)
NEUTROPHILS # BLD AUTO: 4.43 K/UL — SIGNIFICANT CHANGE UP (ref 1.8–7.4)
NEUTROPHILS NFR BLD AUTO: 53.7 % — SIGNIFICANT CHANGE UP (ref 43–77)
NITRITE UR-MCNC: NEGATIVE — SIGNIFICANT CHANGE UP
PH UR: 7 — SIGNIFICANT CHANGE UP (ref 5–8)
PLATELET # BLD AUTO: 243 K/UL — SIGNIFICANT CHANGE UP (ref 150–400)
POTASSIUM SERPL-MCNC: 4.2 MMOL/L — SIGNIFICANT CHANGE UP (ref 3.5–5.3)
POTASSIUM SERPL-SCNC: 4.2 MMOL/L — SIGNIFICANT CHANGE UP (ref 3.5–5.3)
PROT SERPL-MCNC: 7.3 G/DL — SIGNIFICANT CHANGE UP (ref 6.6–8.7)
PROT UR-MCNC: NEGATIVE MG/DL — SIGNIFICANT CHANGE UP
RBC # BLD: 4.77 M/UL — SIGNIFICANT CHANGE UP (ref 4.2–5.8)
RBC # FLD: 13.4 % — SIGNIFICANT CHANGE UP (ref 10.3–14.5)
RBC CASTS # UR COMP ASSIST: SIGNIFICANT CHANGE UP /HPF (ref 0–4)
SODIUM SERPL-SCNC: 140 MMOL/L — SIGNIFICANT CHANGE UP (ref 135–145)
SP GR SPEC: 1.01 — SIGNIFICANT CHANGE UP (ref 1.01–1.02)
UROBILINOGEN FLD QL: NEGATIVE MG/DL — SIGNIFICANT CHANGE UP
WBC # BLD: 8.25 K/UL — SIGNIFICANT CHANGE UP (ref 3.8–10.5)
WBC # FLD AUTO: 8.25 K/UL — SIGNIFICANT CHANGE UP (ref 3.8–10.5)
WBC UR QL: SIGNIFICANT CHANGE UP

## 2019-08-06 PROCEDURE — 99285 EMERGENCY DEPT VISIT HI MDM: CPT

## 2019-08-06 PROCEDURE — 74177 CT ABD & PELVIS W/CONTRAST: CPT | Mod: 26

## 2019-08-06 PROCEDURE — 74018 RADEX ABDOMEN 1 VIEW: CPT | Mod: 26

## 2019-08-06 RX ORDER — SODIUM CHLORIDE 9 MG/ML
1000 INJECTION INTRAMUSCULAR; INTRAVENOUS; SUBCUTANEOUS ONCE
Refills: 0 | Status: COMPLETED | OUTPATIENT
Start: 2019-08-06 | End: 2019-08-06

## 2019-08-06 RX ORDER — MORPHINE SULFATE 50 MG/1
2 CAPSULE, EXTENDED RELEASE ORAL EVERY 4 HOURS
Refills: 0 | Status: DISCONTINUED | OUTPATIENT
Start: 2019-08-07 | End: 2019-08-08

## 2019-08-06 RX ORDER — ONDANSETRON 8 MG/1
4 TABLET, FILM COATED ORAL ONCE
Refills: 0 | Status: COMPLETED | OUTPATIENT
Start: 2019-08-06 | End: 2019-08-07

## 2019-08-06 RX ORDER — ENOXAPARIN SODIUM 100 MG/ML
30 INJECTION SUBCUTANEOUS
Refills: 0 | Status: DISCONTINUED | OUTPATIENT
Start: 2019-08-06 | End: 2019-08-06

## 2019-08-06 RX ORDER — MORPHINE SULFATE 50 MG/1
4 CAPSULE, EXTENDED RELEASE ORAL EVERY 4 HOURS
Refills: 0 | Status: DISCONTINUED | OUTPATIENT
Start: 2019-08-07 | End: 2019-08-08

## 2019-08-06 RX ORDER — SODIUM CHLORIDE 9 MG/ML
1000 INJECTION, SOLUTION INTRAVENOUS
Refills: 0 | Status: DISCONTINUED | OUTPATIENT
Start: 2019-08-07 | End: 2019-08-08

## 2019-08-06 RX ORDER — SODIUM CHLORIDE 9 MG/ML
1000 INJECTION, SOLUTION INTRAVENOUS
Refills: 0 | Status: DISCONTINUED | OUTPATIENT
Start: 2019-08-06 | End: 2019-08-06

## 2019-08-06 RX ORDER — MORPHINE SULFATE 50 MG/1
2 CAPSULE, EXTENDED RELEASE ORAL EVERY 4 HOURS
Refills: 0 | Status: DISCONTINUED | OUTPATIENT
Start: 2019-08-06 | End: 2019-08-06

## 2019-08-06 RX ORDER — FENTANYL CITRATE 50 UG/ML
50 INJECTION INTRAVENOUS
Refills: 0 | Status: DISCONTINUED | OUTPATIENT
Start: 2019-08-06 | End: 2019-08-07

## 2019-08-06 RX ORDER — ONDANSETRON 8 MG/1
4 TABLET, FILM COATED ORAL EVERY 6 HOURS
Refills: 0 | Status: DISCONTINUED | OUTPATIENT
Start: 2019-08-06 | End: 2019-08-06

## 2019-08-06 RX ORDER — KETOROLAC TROMETHAMINE 30 MG/ML
15 SYRINGE (ML) INJECTION EVERY 8 HOURS
Refills: 0 | Status: DISCONTINUED | OUTPATIENT
Start: 2019-08-07 | End: 2019-08-08

## 2019-08-06 RX ORDER — SODIUM CHLORIDE 9 MG/ML
1000 INJECTION, SOLUTION INTRAVENOUS
Refills: 0 | Status: DISCONTINUED | OUTPATIENT
Start: 2019-08-06 | End: 2019-08-07

## 2019-08-06 RX ADMIN — SODIUM CHLORIDE 1000 MILLILITER(S): 9 INJECTION INTRAMUSCULAR; INTRAVENOUS; SUBCUTANEOUS at 13:13

## 2019-08-06 RX ADMIN — SODIUM CHLORIDE 1000 MILLILITER(S): 9 INJECTION INTRAMUSCULAR; INTRAVENOUS; SUBCUTANEOUS at 13:58

## 2019-08-06 RX ADMIN — SODIUM CHLORIDE 100 MILLILITER(S): 9 INJECTION, SOLUTION INTRAVENOUS at 19:34

## 2019-08-06 NOTE — H&P ADULT - HISTORY OF PRESENT ILLNESS
53yo male presents to the ED with complaint of lower abdominal pain, crampy in nature, no relieving or aggravating factors.  Pt was recently in ER on sunday with similar complaints and CT revealing possible early sbo however pt had been passing flatus, contrast was in colon and tolerated PO trial therefore was sent home.    Today he states that discomfort has not resolved and has minimal appetite   Denies nausea or emesis however does report flatus and diarrhea. '

## 2019-08-06 NOTE — ED ADULT TRIAGE NOTE - [facility] TRIAGE ACUITY LEVEL, MLM
Called pt to let them know that the Rx for percocet is ready for  at [facility] ([employer]    
Controlled Substance Refill Request for oxyCODONE-acetaminophen (PERCOCET) 5-325 MG tablet  Problem List Complete:  Yes    Patient is followed by COY MILNER for ongoing prescription of pain medication.  All refills should be approved by this provider, or covering partner.     Medication(s): percocet.   Maximum quantity per month: 30  Clinic visit frequency required: Q 6  months      Controlled substance agreement:      Encounter-Level CSA:      There are no encounter-level csa.          Pain Clinic evaluation in the past: No     DIRE Total Score(s):  No flowsheet data found.     Last [facility] website verification:  ?   [email or web address]  No CSA on file     Last  check -9-18-17-no concerns         Last Written Prescription Date:  12/18/2018  Last Fill Quantity: 30 tablet,  # refills: 0   Last office visit: 12/4/2018 with prescribing provider:  Nasim   Future Office Visit:   Next 5 appointments (look out 90 days)    Apr 01, 2019  2:30 PM CDT  Return Visit with Lolis Davenport PA-C  [facility] ([facility]) [address] [place] [id number]  [phone] OPT 2   Apr 03, 2019 11:30 AM CDT  Office Visit with Coy Milner MD  [facility] ([facility] [address] [address]  [phone]           Controlled substance agreement:   Encounter-Level CSA:    There are no encounter-level csa.     Patient-Level CSA:    There are no patient-level csa.         Last Urine Drug Screen: No results found for: CDAUT, No results found for: COMDAT, No results found for: THC13, PCP13, COC13, MAMP13, OPI13, AMP13, BZO13, TCA13, MTD13, BAR13, OXY13, PPX13, BUP13     Processing:  Patient will  in clinic    [email or web address]   checked in past 3 months?  No, route to RN        
Controlled Substance Refill Request for oxyCODONE-acetaminophen (PERCOCET) 5-325 MG tablet  Problem List Complete:  Yes  Patient is followed by COY MILNER for ongoing prescription of pain medication.  All refills should be approved by this provider, or covering partner.    Medication(s): percocet.   Maximum quantity per month: 30  Clinic visit frequency required: Q 6  months     Controlled substance agreement:  Encounter-Level CSA:     There are no encounter-level csa.        Pain Clinic evaluation in the past: No    DIRE Total Score(s):  No flowsheet data found.    Last [facility] website verification:  ?   [email or web address]  No CSA on file    Last  check -9-18-17-no concerns       checked in past 3 months?  Yes 2/20/19 no concerns        
I printed and signed the RX; pls call pt to pick it up    
Reason for Call:  Medication or medication refill:    Do you use a [facility]?  Name of the pharmacy and phone number for the current request:  [facility]    Name of the medication requested: oxyCODONE-acetaminophen (PERCOCET) 5-325 MG tablet    Other request: call to [email or web address]    Can we leave a detailed message on this number? YES    Phone number patient can be reached at: Home number on file [phone] (home)    Best Time:     Call taken on 2/18/2019 at 11:13 AM by HAN PARIS    
Theresa called stating that Mandy will  rx  
3

## 2019-08-06 NOTE — H&P ADULT - NSHPPHYSICALEXAM_GEN_ALL_CORE
GENERAL: Alert, well developed, in no acute distress.  MENTAL STATUS: AAOx3. Appropriate affect.  RESPIRATORY: CTAB. No wheezing, rales or rhonchi.  CARDIOVASCULAR: RRR. No audible murmurs, rubs or gallops.   GASTROINTESTINAL: Abdomen soft, minimally tender to lower quadrats, non distended, no rebound or guarding  NEUROLOGIC: Cranial nerves II-XII grossly intact. No focal neurological deficits. Moves all extremities spontaneously. Sensation intact bilaterally.  MUSCULOSKELETAL: No cyanosis or clubbing. No gross deformities.

## 2019-08-06 NOTE — ED STATDOCS - ENMT, MLM
Tongue is coated, dry. Nasal mucosa clear.  Mouth with normal mucosa  Throat has no vesicles, no oropharyngeal exudates and uvula is midline.

## 2019-08-06 NOTE — ED ADULT NURSE NOTE - CHPI ED NUR SYMPTOMS NEG
no vomiting/no diarrhea/no burning urination/no blood in stool/no abdominal distension/no hematuria/no dysuria/no fever/no chills/no nausea

## 2019-08-06 NOTE — ED ADULT NURSE NOTE - OBJECTIVE STATEMENT
cramping generalized abdominal pain without any other symptoms for the past week. here once few days ago for same, ct suggested possible early sbo. however, pt had no symptoms and was sent home.

## 2019-08-06 NOTE — ED STATDOCS - CLINICAL SUMMARY MEDICAL DECISION MAKING FREE TEXT BOX
pt with recurrent abd pain. thought  to have sbo a few days ago but was tolerating po and was sent home. Now pain reoccurs and is more severe. pe as documented.  iv ivf iv meds imaging labs  surgery consut for + ct for + sbo  admit

## 2019-08-06 NOTE — ED ADULT TRIAGE NOTE - CHIEF COMPLAINT QUOTE
DC from hospital Sunday. Arrives with continued ABD pain. No bleeding. No vomit, no nausea. No diarrhea

## 2019-08-06 NOTE — H&P ADULT - ASSESSMENT
54M otherwise healthy hx of L ing hernia repair presenting w/ SBO    NPO, IVF  NGT if vomits  repeat KUB in am   if SBO not resolved in am will proceed w/ surgery

## 2019-08-06 NOTE — ED STATDOCS - OBJECTIVE STATEMENT
55 y/o M pt with no PMHx presents to the ED c/o abdominal pain. Pt was discharged from [facility] 2 days ago for similar complaints of RUQ abd pain. He had a CT and BW performed, was seen by surgery. Given Maxitate, but vomited it up. Now is back for continuous abdominal pain with associated diarrhea and bloating. Denies n/v, fever, chills.   Current smoker

## 2019-08-07 DIAGNOSIS — K56.609 UNSPECIFIED INTESTINAL OBSTRUCTION, UNSPECIFIED AS TO PARTIAL VERSUS COMPLETE OBSTRUCTION: ICD-10-CM

## 2019-08-07 LAB
ANION GAP SERPL CALC-SCNC: 9 MMOL/L — SIGNIFICANT CHANGE UP (ref 5–17)
BUN SERPL-MCNC: 9 MG/DL — SIGNIFICANT CHANGE UP (ref 8–20)
CALCIUM SERPL-MCNC: 9 MG/DL — SIGNIFICANT CHANGE UP (ref 8.6–10.2)
CHLORIDE SERPL-SCNC: 104 MMOL/L — SIGNIFICANT CHANGE UP (ref 98–107)
CO2 SERPL-SCNC: 26 MMOL/L — SIGNIFICANT CHANGE UP (ref 22–29)
CREAT SERPL-MCNC: 0.87 MG/DL — SIGNIFICANT CHANGE UP (ref 0.5–1.3)
GLUCOSE SERPL-MCNC: 97 MG/DL — SIGNIFICANT CHANGE UP (ref 70–115)
HCT VFR BLD CALC: 40.1 % — SIGNIFICANT CHANGE UP (ref 39–50)
HGB BLD-MCNC: 13.5 G/DL — SIGNIFICANT CHANGE UP (ref 13–17)
MAGNESIUM SERPL-MCNC: 1.7 MG/DL — SIGNIFICANT CHANGE UP (ref 1.6–2.6)
MCHC RBC-ENTMCNC: 31.4 PG — SIGNIFICANT CHANGE UP (ref 27–34)
MCHC RBC-ENTMCNC: 33.7 GM/DL — SIGNIFICANT CHANGE UP (ref 32–36)
MCV RBC AUTO: 93.3 FL — SIGNIFICANT CHANGE UP (ref 80–100)
PLATELET # BLD AUTO: 200 K/UL — SIGNIFICANT CHANGE UP (ref 150–400)
POTASSIUM SERPL-MCNC: 4.4 MMOL/L — SIGNIFICANT CHANGE UP (ref 3.5–5.3)
POTASSIUM SERPL-SCNC: 4.4 MMOL/L — SIGNIFICANT CHANGE UP (ref 3.5–5.3)
RBC # BLD: 4.3 M/UL — SIGNIFICANT CHANGE UP (ref 4.2–5.8)
RBC # FLD: 13.4 % — SIGNIFICANT CHANGE UP (ref 10.3–14.5)
SODIUM SERPL-SCNC: 139 MMOL/L — SIGNIFICANT CHANGE UP (ref 135–145)
WBC # BLD: 12.2 K/UL — HIGH (ref 3.8–10.5)
WBC # FLD AUTO: 12.2 K/UL — HIGH (ref 3.8–10.5)

## 2019-08-07 PROCEDURE — 99223 1ST HOSP IP/OBS HIGH 75: CPT

## 2019-08-07 RX ORDER — ENOXAPARIN SODIUM 100 MG/ML
40 INJECTION SUBCUTANEOUS DAILY
Refills: 0 | Status: DISCONTINUED | OUTPATIENT
Start: 2019-08-07 | End: 2019-08-09

## 2019-08-07 RX ORDER — MAGNESIUM SULFATE 500 MG/ML
2 VIAL (ML) INJECTION ONCE
Refills: 0 | Status: COMPLETED | OUTPATIENT
Start: 2019-08-07 | End: 2019-08-07

## 2019-08-07 RX ADMIN — Medication 50 GRAM(S): at 16:15

## 2019-08-07 RX ADMIN — MORPHINE SULFATE 2 MILLIGRAM(S): 50 CAPSULE, EXTENDED RELEASE ORAL at 01:40

## 2019-08-07 RX ADMIN — FENTANYL CITRATE 50 MICROGRAM(S): 50 INJECTION INTRAVENOUS at 00:00

## 2019-08-07 RX ADMIN — Medication 15 MILLIGRAM(S): at 01:15

## 2019-08-07 RX ADMIN — MORPHINE SULFATE 2 MILLIGRAM(S): 50 CAPSULE, EXTENDED RELEASE ORAL at 01:22

## 2019-08-07 RX ADMIN — Medication 15 MILLIGRAM(S): at 01:05

## 2019-08-07 RX ADMIN — FENTANYL CITRATE 50 MICROGRAM(S): 50 INJECTION INTRAVENOUS at 00:50

## 2019-08-07 RX ADMIN — Medication 15 MILLIGRAM(S): at 08:23

## 2019-08-07 RX ADMIN — Medication 15 MILLIGRAM(S): at 16:12

## 2019-08-07 RX ADMIN — SODIUM CHLORIDE 100 MILLILITER(S): 9 INJECTION, SOLUTION INTRAVENOUS at 08:29

## 2019-08-07 RX ADMIN — ONDANSETRON 4 MILLIGRAM(S): 8 TABLET, FILM COATED ORAL at 00:06

## 2019-08-07 RX ADMIN — ENOXAPARIN SODIUM 40 MILLIGRAM(S): 100 INJECTION SUBCUTANEOUS at 11:50

## 2019-08-07 RX ADMIN — Medication 15 MILLIGRAM(S): at 16:25

## 2019-08-07 RX ADMIN — FENTANYL CITRATE 50 MICROGRAM(S): 50 INJECTION INTRAVENOUS at 00:15

## 2019-08-07 RX ADMIN — Medication 15 MILLIGRAM(S): at 08:36

## 2019-08-07 RX ADMIN — FENTANYL CITRATE 50 MICROGRAM(S): 50 INJECTION INTRAVENOUS at 00:36

## 2019-08-07 NOTE — PATIENT PROFILE ADULT - HAS THE PATIENT BEEN ADMITTED FROM SHORT TERM REHAB?
no Detail Level: Detailed Quality 226: Preventive Care And Screening: Tobacco Use: Screening And Cessation Intervention: Patient screened for tobacco use and is an ex/non-smoker Quality 110: Preventive Care And Screening: Influenza Immunization: Influenza Immunization Administered during Influenza season Quality 111:Pneumonia Vaccination Status For Older Adults: Pneumococcal Vaccination Previously Received

## 2019-08-07 NOTE — PROGRESS NOTE ADULT - ASSESSMENT
54M otherwise healthy hx of L ing hernia repair presenting w/ SBO. No dreins    NPO, IVF  Monitor NGT output  Advance diet as tolerated

## 2019-08-07 NOTE — CONSULT NOTE ADULT - PROBLEM SELECTOR RECOMMENDATION 9
-  no symptoms of chronic IBD symptoms  - will order IBD  panel.   - will do CTE /capsule endo as outpatient in 6-8 weeks after post surgical edema resovles.   -  I gave pt my card and number. please call back with questions. F/U in 6 weeks -  no symptoms of chronic IBD symptoms  - will order IBD  panel- send out lab. Takes a least a week for results  - will do CTE /capsule endo as outpatient in 6-8 weeks after post surgical edema resovles.   -  I gave pt my card and number. please call back with questions. F/U in 6 weeks

## 2019-08-07 NOTE — PROGRESS NOTE ADULT - SUBJECTIVE AND OBJECTIVE BOX
POSTOP CHECK    HPI/OVERNIGHT EVENTS:    Pt s/p exploratory laparotomy with lysis adhesions.     MEDICATIONS  (STANDING):  ketorolac   Injectable 15 milliGRAM(s) IV Push every 8 hours  multiple electrolytes Injection Type 1 1000 milliLiter(s) (75 mL/Hr) IV Continuous <Continuous>    MEDICATIONS  (PRN):  morphine  - Injectable 2 milliGRAM(s) IV Push every 4 hours PRN Moderate Pain (4 - 6)  morphine  - Injectable 4 milliGRAM(s) IV Push every 4 hours PRN Severe Pain (7 - 10)      Vital Signs Last 24 Hrs  T(C): 36.5 (07 Aug 2019 01:54), Max: 37.3 (07 Aug 2019 01:20)  T(F): 97.7 (07 Aug 2019 01:54), Max: 99.1 (07 Aug 2019 01:20)  HR: 72 (07 Aug 2019 01:54) (63 - 99)  BP: 108/63 (07 Aug 2019 01:54) (104/69 - 134/74)  BP(mean): 80 (07 Aug 2019 01:20) (78 - 88)  RR: 18 (07 Aug 2019 01:54) (12 - 20)  SpO2: 96% (07 Aug 2019 01:54) (96% - 100%)    Constitutional: patient resting comfortably in bed, in no acute distress    Respiratory: respirations are unlabored, no accessory muscle use, no conversational dyspnea  Cardiovascular: regular rate & rhythm  Gastrointestinal: Abdomen soft, non-tender, non-distended, no rebound tenderness / guarding      I&O's Detail    06 Aug 2019 07:01  -  07 Aug 2019 04:40  --------------------------------------------------------  IN:    lactated ringers.: 200 mL    multiple electrolytes Injection Type 1: 75 mL  Total IN: 275 mL    OUT:  Total OUT: 0 mL    Total NET: 275 mL          LABS:                        14.8   8.25  )-----------( 243      ( 06 Aug 2019 13:23 )             45.0     08-06    140  |  102  |  9.0  ----------------------------<  104  4.2   |  28.0  |  0.85    Ca    9.7      06 Aug 2019 13:23    TPro  7.3  /  Alb  4.2  /  TBili  0.5  /  DBili  x   /  AST  17  /  ALT  9   /  AlkPhos  59  08-06      Urinalysis Basic - ( 06 Aug 2019 13:30 )    Color: Yellow / Appearance: Clear / S.010 / pH: x  Gluc: x / Ketone: Negative  / Bili: Negative / Urobili: Negative mg/dL   Blood: x / Protein: Negative mg/dL / Nitrite: Negative   Leuk Esterase: Negative / RBC: 0-2 /HPF / WBC 0-2   Sq Epi: x / Non Sq Epi: Occasional / Bacteria: Occasional

## 2019-08-07 NOTE — CONSULT NOTE ADULT - SUBJECTIVE AND OBJECTIVE BOX
Patient is a 54y old  Male who presents with a chief complaint of SBO (07 Aug 2019 04:40)      HPI:  53yo male presents to the ED - hx of left inguinal hernia repair. No chronic medical problems. Began with lower abdominal cramping beginning 8/2. Pain was intermittent, lasted 30 minutes at at time. Moderate severity at first. Was passing flatus. No nausea or vomiting. Pt is denying loose stools to me. No fevers.  Pain progressively worse so came to Er on 8/4. CT showed transition zone in right karon abdomen, but as pt was passing flatus, he was discharge. Pain continued and he returned to ER . On 8/6 CT was unchanged. KUB did show contrast in the colon.  EX lap done yesterday. Had CHIP. NO mention of a pericolic fat stranding on the op report.         REVIEW OF SYSTEMS:  Constitutional: No fever, weight loss or fatigue  ENMT:  No difficulty hearing, tinnitus, vertigo; No sinus or throat pain  Respiratory: No cough, wheezing, chills or hemoptysis  Cardiovascular: No chest pain, palpitations, dizziness or leg swelling  Gastrointestinal: + abdominal or epigastric pain. No nausea, vomiting or hematemesis; No diarrhea or constipation. No melena or hematochezia.  Skin: No itching, burning, rashes or lesions   Musculoskeletal: No joint pain or swelling; No muscle, back or extremity pain    PAST MEDICAL & SURGICAL HISTORY:  No pertinent past medical history  S/P left inguinal hernia repair      FAMILY HISTORY:  Family history of hypertension      SOCIAL HISTORY:  Smoking Status: [ ] Current, [ ] Former, [ ] Never  Pack Years:  [  ] EtOH-no  [  ] IVDA-no    MEDICATIONS:  MEDICATIONS  (STANDING):  enoxaparin Injectable 40 milliGRAM(s) SubCutaneous daily  ketorolac   Injectable 15 milliGRAM(s) IV Push every 8 hours  multiple electrolytes Injection Type 1 1000 milliLiter(s) (100 mL/Hr) IV Continuous <Continuous>    MEDICATIONS  (PRN):  morphine  - Injectable 2 milliGRAM(s) IV Push every 4 hours PRN Moderate Pain (4 - 6)  morphine  - Injectable 4 milliGRAM(s) IV Push every 4 hours PRN Severe Pain (7 - 10)      Allergies    Milk (Rash; Urticaria)  No Known Drug Allergies    Intolerances        Vital Signs Last 24 Hrs  T(C): 36.6 (07 Aug 2019 07:46), Max: 37.3 (07 Aug 2019 01:20)  T(F): 97.9 (07 Aug 2019 07:46), Max: 99.1 (07 Aug 2019 01:20)  HR: 57 (07 Aug 2019 07:46) (57 - 99)  BP: 102/62 (07 Aug 2019 07:46) (101/64 - 134/74)  BP(mean): 80 (07 Aug 2019 01:20) (78 - 88)  RR: 18 (07 Aug 2019 07:46) (12 - 18)  SpO2: 97% (07 Aug 2019 07:46) (96% - 100%)    08-06 @ 07:01  -  08-07 @ 07:00  --------------------------------------------------------  IN: 275 mL / OUT: 450 mL / NET: -175 mL    08-07 @ 07:01  -  08-07 @ 12:14  --------------------------------------------------------  IN: 300 mL / OUT: 100 mL / NET: 200 mL          PHYSICAL EXAM:    General: thin; in no acute distress. NGT in place- to suction  HEENT: MMM, conjunctiva and sclera clear  H- RRR  L- CTA  Gastrointestinal: Soft, non-tender non-distended; decreased  bowel sounds; No rebound or guarding  Extremities: Normal range of motion, No clubbing, cyanosis or edema  Neurological: Alert and oriented x3  Skin: Warm and dry. No obvious rash      LABS:                        13.5   12.20 )-----------( 200      ( 07 Aug 2019 08:11 )             40.1     07 Aug 2019 08:11    139    |  104    |  9.0    ----------------------------<  97     4.4     |  26.0   |  0.87     Ca    9.0        07 Aug 2019 08:11  Mg     1.7       07 Aug 2019 08:11    TPro  7.3    /  Alb  4.2    /  TBili  0.5    /  DBili  x      /  AST  17     /  ALT  9      /  AlkPhos  59     / Amylase x      /Lipase 20     06 Aug 2019 13:23              RADIOLOGY & ADDITIONAL STUDIES:     < from: CT Abdomen and Pelvis w/ Oral Cont and w/ IV Cont (08.06.19 @ 14:59) >  IMPRESSION:     Small bowel obstruction with a transition point in the right hemiabdomen.    The findings were discussed with Dr. Galdamez at 3:17 PM on 8/6/2019.    < end of copied text >

## 2019-08-07 NOTE — PATIENT PROFILE ADULT - VISION (WITH CORRECTIVE LENSES IF THE PATIENT USUALLY WEARS THEM):
Normal vision: sees adequately in most situations; can see medication labels, newsprint pt wears eyeglasses for reading/Normal vision: sees adequately in most situations; can see medication labels, newsprint

## 2019-08-08 LAB
ANION GAP SERPL CALC-SCNC: 12 MMOL/L — SIGNIFICANT CHANGE UP (ref 5–17)
APTT BLD: 33.7 SEC — SIGNIFICANT CHANGE UP (ref 27.5–36.3)
BASOPHILS # BLD AUTO: 0.06 K/UL — SIGNIFICANT CHANGE UP (ref 0–0.2)
BASOPHILS NFR BLD AUTO: 0.7 % — SIGNIFICANT CHANGE UP (ref 0–2)
BUN SERPL-MCNC: 15 MG/DL — SIGNIFICANT CHANGE UP (ref 8–20)
CALCIUM SERPL-MCNC: 8.6 MG/DL — SIGNIFICANT CHANGE UP (ref 8.6–10.2)
CHLORIDE SERPL-SCNC: 103 MMOL/L — SIGNIFICANT CHANGE UP (ref 98–107)
CO2 SERPL-SCNC: 28 MMOL/L — SIGNIFICANT CHANGE UP (ref 22–29)
CREAT SERPL-MCNC: 0.95 MG/DL — SIGNIFICANT CHANGE UP (ref 0.5–1.3)
EOSINOPHIL # BLD AUTO: 0.42 K/UL — SIGNIFICANT CHANGE UP (ref 0–0.5)
EOSINOPHIL NFR BLD AUTO: 4.7 % — SIGNIFICANT CHANGE UP (ref 0–6)
GLUCOSE SERPL-MCNC: 67 MG/DL — LOW (ref 70–115)
HCT VFR BLD CALC: 40.9 % — SIGNIFICANT CHANGE UP (ref 39–50)
HGB BLD-MCNC: 13.5 G/DL — SIGNIFICANT CHANGE UP (ref 13–17)
IMM GRANULOCYTES NFR BLD AUTO: 0.2 % — SIGNIFICANT CHANGE UP (ref 0–1.5)
INR BLD: 1.2 RATIO — HIGH (ref 0.88–1.16)
LYMPHOCYTES # BLD AUTO: 3.14 K/UL — SIGNIFICANT CHANGE UP (ref 1–3.3)
LYMPHOCYTES # BLD AUTO: 35.4 % — SIGNIFICANT CHANGE UP (ref 13–44)
MAGNESIUM SERPL-MCNC: 2.1 MG/DL — SIGNIFICANT CHANGE UP (ref 1.6–2.6)
MCHC RBC-ENTMCNC: 30.5 PG — SIGNIFICANT CHANGE UP (ref 27–34)
MCHC RBC-ENTMCNC: 33 GM/DL — SIGNIFICANT CHANGE UP (ref 32–36)
MCV RBC AUTO: 92.3 FL — SIGNIFICANT CHANGE UP (ref 80–100)
MONOCYTES # BLD AUTO: 1.15 K/UL — HIGH (ref 0–0.9)
MONOCYTES NFR BLD AUTO: 13 % — SIGNIFICANT CHANGE UP (ref 2–14)
NEUTROPHILS # BLD AUTO: 4.07 K/UL — SIGNIFICANT CHANGE UP (ref 1.8–7.4)
NEUTROPHILS NFR BLD AUTO: 46 % — SIGNIFICANT CHANGE UP (ref 43–77)
PHOSPHATE SERPL-MCNC: 3.1 MG/DL — SIGNIFICANT CHANGE UP (ref 2.4–4.7)
PLATELET # BLD AUTO: 231 K/UL — SIGNIFICANT CHANGE UP (ref 150–400)
POTASSIUM SERPL-MCNC: 3.7 MMOL/L — SIGNIFICANT CHANGE UP (ref 3.5–5.3)
POTASSIUM SERPL-SCNC: 3.7 MMOL/L — SIGNIFICANT CHANGE UP (ref 3.5–5.3)
PROTHROM AB SERPL-ACNC: 13.9 SEC — HIGH (ref 10–12.9)
RBC # BLD: 4.43 M/UL — SIGNIFICANT CHANGE UP (ref 4.2–5.8)
RBC # FLD: 13.4 % — SIGNIFICANT CHANGE UP (ref 10.3–14.5)
SODIUM SERPL-SCNC: 143 MMOL/L — SIGNIFICANT CHANGE UP (ref 135–145)
WBC # BLD: 8.86 K/UL — SIGNIFICANT CHANGE UP (ref 3.8–10.5)
WBC # FLD AUTO: 8.86 K/UL — SIGNIFICANT CHANGE UP (ref 3.8–10.5)

## 2019-08-08 PROCEDURE — 99024 POSTOP FOLLOW-UP VISIT: CPT

## 2019-08-08 RX ORDER — OXYCODONE HYDROCHLORIDE 5 MG/1
5 TABLET ORAL EVERY 4 HOURS
Refills: 0 | Status: DISCONTINUED | OUTPATIENT
Start: 2019-08-08 | End: 2019-08-09

## 2019-08-08 RX ORDER — ACETAMINOPHEN 500 MG
650 TABLET ORAL EVERY 6 HOURS
Refills: 0 | Status: DISCONTINUED | OUTPATIENT
Start: 2019-08-08 | End: 2019-08-09

## 2019-08-08 RX ADMIN — Medication 15 MILLIGRAM(S): at 12:58

## 2019-08-08 RX ADMIN — Medication 650 MILLIGRAM(S): at 23:43

## 2019-08-08 RX ADMIN — Medication 15 MILLIGRAM(S): at 00:35

## 2019-08-08 RX ADMIN — Medication 15 MILLIGRAM(S): at 22:27

## 2019-08-08 RX ADMIN — Medication 650 MILLIGRAM(S): at 18:12

## 2019-08-08 RX ADMIN — Medication 15 MILLIGRAM(S): at 01:05

## 2019-08-08 RX ADMIN — ENOXAPARIN SODIUM 40 MILLIGRAM(S): 100 INJECTION SUBCUTANEOUS at 12:59

## 2019-08-08 RX ADMIN — Medication 15 MILLIGRAM(S): at 22:57

## 2019-08-08 NOTE — PROGRESS NOTE ADULT - ASSESSMENT
55 yo M POD2 s/p Ex Lap w/ CHIP. Midline incision continues to saturate dressings. Staples are intact with no undermining of wound or pain on exam. Pt is asx.     -Monitor midline incision  -F/u coags   -Monitor VS  -Monitor I's and O's

## 2019-08-08 NOTE — PROGRESS NOTE ADULT - SUBJECTIVE AND OBJECTIVE BOX
HPI/OVERNIGHT EVENTS:  Pt examined at bedside. Overnight ACS was called to bedside for 2x episode of saturated midline dressings. Clotted blood was removed from staple site and dressings changed. Pt denied abdominal pain, abdominal fullness, dizziness, change in vision, CP, palpitations, SOB, fevers, chills. He reports that he first noticed the bleeding after forceful laughing early in the evening.     MEDICATIONS  (STANDING):  enoxaparin Injectable 40 milliGRAM(s) SubCutaneous daily  ketorolac   Injectable 15 milliGRAM(s) IV Push every 8 hours  multiple electrolytes Injection Type 1 1000 milliLiter(s) (100 mL/Hr) IV Continuous <Continuous>    MEDICATIONS  (PRN):  morphine  - Injectable 2 milliGRAM(s) IV Push every 4 hours PRN Moderate Pain (4 - 6)  morphine  - Injectable 4 milliGRAM(s) IV Push every 4 hours PRN Severe Pain (7 - 10)      Vital Signs Last 24 Hrs  T(C): 36.5 (07 Aug 2019 23:12), Max: 36.6 (07 Aug 2019 05:25)  T(F): 97.7 (07 Aug 2019 23:12), Max: 97.9 (07 Aug 2019 07:46)  HR: 58 (07 Aug 2019 23:12) (56 - 72)  BP: 102/59 (07 Aug 2019 23:12) (99/62 - 108/63)  BP(mean): --  RR: 18 (07 Aug 2019 23:12) (18 - 18)  SpO2: 97% (07 Aug 2019 23:12) (96% - 98%)    Constitutional: patient resting comfortably in bed, in no acute distress  HEENT: EOMI / PERRL b/l, no active drainage or redness  Neck: No JVD, full ROM without pain  Respiratory: respirations are unlabored, no accessory muscle use, no conversational dyspnea  Cardiovascular: regular rate & rhythm  Gastrointestinal: Abdomen soft, non-tender, non-distended, 8 midline staples intact, clotted blood is noted near mid incision.   Vascular: pulses 2+ B/L upper and lower extremities   Psychiatric: Normal mood, normal affect        I&O's Detail    06 Aug 2019 07:01  -  07 Aug 2019 07:00  --------------------------------------------------------  IN:    lactated ringers.: 200 mL    multiple electrolytes Injection Type 1: 75 mL  Total IN: 275 mL    OUT:    Voided: 450 mL  Total OUT: 450 mL    Total NET: -175 mL      07 Aug 2019 07:01  -  08 Aug 2019 01:29  --------------------------------------------------------  IN:    multiple electrolytes Injection Type 1: 1100 mL  Total IN: 1100 mL    OUT:    Nasoenteral Tube: 450 mL    Voided: 625 mL  Total OUT: 1075 mL    Total NET: 25 mL          LABS:                        13.5   12.20 )-----------( 200      ( 07 Aug 2019 08:11 )             40.1     08-07    139  |  104  |  9.0  ----------------------------<  97  4.4   |  26.0  |  0.87    Ca    9.0      07 Aug 2019 08:11  Mg     1.7     08-07    TPro  7.3  /  Alb  4.2  /  TBili  0.5  /  DBili  x   /  AST  17  /  ALT  9   /  AlkPhos  59  08-06      Urinalysis Basic - ( 06 Aug 2019 13:30 )    Color: Yellow / Appearance: Clear / S.010 / pH: x  Gluc: x / Ketone: Negative  / Bili: Negative / Urobili: Negative mg/dL   Blood: x / Protein: Negative mg/dL / Nitrite: Negative   Leuk Esterase: Negative / RBC: 0-2 /HPF / WBC 0-2   Sq Epi: x / Non Sq Epi: Occasional / Bacteria: Occasional

## 2019-08-08 NOTE — PROGRESS NOTE ADULT - ATTENDING COMMENTS
patient seen and examined.  passing flatus, abd midlly distended,  start liquids today  OOB  ambulate  dispo planing

## 2019-08-09 ENCOUNTER — TRANSCRIPTION ENCOUNTER (OUTPATIENT)
Age: 54
End: 2019-08-09

## 2019-08-09 VITALS
TEMPERATURE: 97 F | DIASTOLIC BLOOD PRESSURE: 73 MMHG | OXYGEN SATURATION: 96 % | RESPIRATION RATE: 18 BRPM | HEART RATE: 625 BPM | SYSTOLIC BLOOD PRESSURE: 123 MMHG

## 2019-08-09 PROCEDURE — 96360 HYDRATION IV INFUSION INIT: CPT

## 2019-08-09 PROCEDURE — 85730 THROMBOPLASTIN TIME PARTIAL: CPT

## 2019-08-09 PROCEDURE — 80053 COMPREHEN METABOLIC PANEL: CPT

## 2019-08-09 PROCEDURE — 84100 ASSAY OF PHOSPHORUS: CPT

## 2019-08-09 PROCEDURE — 88346 IMFLUOR 1ST 1ANTB STAIN PX: CPT

## 2019-08-09 PROCEDURE — 36415 COLL VENOUS BLD VENIPUNCTURE: CPT

## 2019-08-09 PROCEDURE — 86140 C-REACTIVE PROTEIN: CPT

## 2019-08-09 PROCEDURE — 99285 EMERGENCY DEPT VISIT HI MDM: CPT | Mod: 25

## 2019-08-09 PROCEDURE — 83520 IMMUNOASSAY QUANT NOS NONAB: CPT

## 2019-08-09 PROCEDURE — 83605 ASSAY OF LACTIC ACID: CPT

## 2019-08-09 PROCEDURE — 85027 COMPLETE CBC AUTOMATED: CPT

## 2019-08-09 PROCEDURE — 80048 BASIC METABOLIC PNL TOTAL CA: CPT

## 2019-08-09 PROCEDURE — 74018 RADEX ABDOMEN 1 VIEW: CPT

## 2019-08-09 PROCEDURE — 74177 CT ABD & PELVIS W/CONTRAST: CPT

## 2019-08-09 PROCEDURE — 81001 URINALYSIS AUTO W/SCOPE: CPT

## 2019-08-09 PROCEDURE — 85610 PROTHROMBIN TIME: CPT

## 2019-08-09 PROCEDURE — 83735 ASSAY OF MAGNESIUM: CPT

## 2019-08-09 PROCEDURE — 99024 POSTOP FOLLOW-UP VISIT: CPT

## 2019-08-09 PROCEDURE — 81479 UNLISTED MOLECULAR PATHOLOGY: CPT

## 2019-08-09 PROCEDURE — 88350 IMFLUOR EA ADDL 1ANTB STN PX: CPT

## 2019-08-09 PROCEDURE — 83690 ASSAY OF LIPASE: CPT

## 2019-08-09 RX ADMIN — Medication 650 MILLIGRAM(S): at 11:20

## 2019-08-09 RX ADMIN — ENOXAPARIN SODIUM 40 MILLIGRAM(S): 100 INJECTION SUBCUTANEOUS at 11:20

## 2019-08-09 RX ADMIN — Medication 650 MILLIGRAM(S): at 06:24

## 2019-08-09 RX ADMIN — Medication 650 MILLIGRAM(S): at 12:20

## 2019-08-09 RX ADMIN — Medication 650 MILLIGRAM(S): at 06:55

## 2019-08-09 RX ADMIN — Medication 650 MILLIGRAM(S): at 00:15

## 2019-08-09 NOTE — DISCHARGE NOTE PROVIDER - CARE PROVIDER_API CALL
Saulo Pedraza (MD)  Surgery  [address] [place] [address], [address]  [place] [address]  Phone: [phone]  Fax: [phone]  Follow Up Time:     Huma Roberto (DO)  Gastroenterology  [address], [address]  [place] [address]  Phone: [phone]  Fax: [phone]  Follow Up Time:

## 2019-08-09 NOTE — DISCHARGE NOTE PROVIDER - NSDCCPTREATMENT_GEN_ALL_CORE_FT
PRINCIPAL PROCEDURE  Procedure: Exploratory laparotomy with lysis of adhesions  Findings and Treatment:

## 2019-08-09 NOTE — DISCHARGE NOTE PROVIDER - HOSPITAL COURSE
Admission HPI: 55yo male presents to the ED with complaint of lower abdominal pain, crampy in nature, no relieving or aggravating factors. Pt was recently in ER on Sunday with similar complaints and CT revealing possible early sbo however pt had been passing flatus, contrast was in colon and tolerated PO trial therefore was sent home. Today he states that discomfort has not resolved and has minimal appetite. Denies nausea or emesis however does report flatus and diarrhea.        Hospital Course: CT abd showed small bowel obstruction with a transition point in the right hemiabdomen. Patient was admitted to the ACS service and taken to the OR with Dr. Pedraza on 8/6 for ex-lap CHIP. Patient tolerated procedure well and transferred to surgical floors in stable condition. Post-op NGT output downtrended & bowel function returned - NGT removed and diet gradually advanced for which he tolerated well. He is currently tolerating diet, pain controlled, OOB ambulating, voiding, and having bowel function. Pt expressed verbal understanding that if he is to experience reoccurrence / worsening of abdominal pain, nausea/vomiting, or inability to tolerate PO intake he is to immediately return to the ED for evaluation and he is agreeable with plan. Admission HPI: 55yo male presents to the ED with complaint of lower abdominal pain, crampy in nature, no relieving or aggravating factors. Pt was recently in ER on Sunday with similar complaints and CT revealing possible early sbo however pt had been passing flatus, contrast was in colon and tolerated PO trial therefore was sent home. Today he states that discomfort has not resolved and has minimal appetite. Denies nausea or emesis however does report flatus and diarrhea.        Hospital Course: CT abd showed small bowel obstruction with a transition point in the right hemiabdomen. Patient was admitted to the ACS service and taken to the OR with Dr. Pedraza on 8/6 for ex-lap CHIP. Patient tolerated procedure well and transferred to surgical floors in stable condition. Post-op NGT output downtrended & bowel function returned - NGT removed and diet gradually advanced for which he tolerated well. Pt choosing to stay on full liquids at time of discharge per his own request. He is currently tolerating diet, pain controlled, OOB ambulating, voiding, and having bowel function. Pt expressed verbal understanding that if he is to experience reoccurrence / worsening of abdominal pain, nausea/vomiting, or inability to tolerate PO intake he is to immediately return to the ED for evaluation and he is agreeable with plan.

## 2019-08-09 NOTE — PROGRESS NOTE ADULT - SUBJECTIVE AND OBJECTIVE BOX
SUBJECTIVE:   LUCIANO overnight. Pain is well controlled. Tolerating clears, denies nausea/vomiting. Pt denied abdominal pain, abdominal fullness, dizziness, change in vision, CP, palpitations, SOB, fevers, chills.     MEDICATIONS  (STANDING):  acetaminophen   Tablet .. 650 milliGRAM(s) Oral every 6 hours  enoxaparin Injectable 40 milliGRAM(s) SubCutaneous daily    MEDICATIONS  (PRN):  oxyCODONE    IR 5 milliGRAM(s) Oral every 4 hours PRN Severe Pain (7 - 10)      Vital Signs Last 24 Hrs  T(C): 36.8 (08 Aug 2019 23:09), Max: 36.8 (08 Aug 2019 16:59)  T(F): 98.3 (08 Aug 2019 23:09), Max: 98.3 (08 Aug 2019 16:59)  HR: 67 (08 Aug 2019 23:09) (60 - 67)  BP: 112/67 (08 Aug 2019 23:09) (101/60 - 112/67)  BP(mean): --  RR: 18 (08 Aug 2019 23:09) (18 - 18)  SpO2: 97% (08 Aug 2019 23:09) (97% - 99%)    PE  Constitutional: patient resting comfortably in bed, in no acute distress  HEENT: EOMI / PERRL b/l, no active drainage or redness  Neck: No JVD, full ROM without pain  Respiratory: respirations are unlabored, no accessory muscle use, no conversational dyspnea  Cardiovascular: regular rate & rhythm  Gastrointestinal: Abdomen soft, non-tender, non-distended, 8 midline staples intact, dressing on midline with minimal dark bloody shadowing  Vascular: pulses 2+ B/L upper and lower extremities         I&O's Detail    07 Aug 2019 07:01  -  08 Aug 2019 07:00  --------------------------------------------------------  IN:    multiple electrolytes Injection Type 1multiple electrolytes Injection Type 1: 2300 mL  Total IN: 2300 mL    OUT:    Nasoenteral Tube: 650 mL    Voided: 625 mL  Total OUT: 1275 mL    Total NET: 1025 mL      08 Aug 2019 07:01  -  09 Aug 2019 00:29  --------------------------------------------------------  IN:  Total IN: 0 mL    OUT:    Voided: 300 mL  Total OUT: 300 mL    Total NET: -300 mL          LABS:                        13.5   8.86  )-----------( 231      ( 08 Aug 2019 06:45 )             40.9     08-08    143  |  103  |  15.0  ----------------------------<  67<L>  3.7   |  28.0  |  0.95    Ca    8.6      08 Aug 2019 06:45  Phos  3.1     08-08  Mg     2.1     08-08      PT/INR - ( 08 Aug 2019 06:45 )   PT: 13.9 sec;   INR: 1.20 ratio         PTT - ( 08 Aug 2019 06:45 )  PTT:33.7 sec      RADIOLOGY & ADDITIONAL STUDIES:

## 2019-08-09 NOTE — DISCHARGE NOTE PROVIDER - NSDCCPCAREPLAN_GEN_ALL_CORE_FT
PRINCIPAL DISCHARGE DIAGNOSIS  Diagnosis: Small bowel obstruction  Assessment and Plan of Treatment: Follow up: Please call and make an appointment with Dr. Pedraza 10-14 days after discharge, and with the gastroenterollogist Dr. Roberto 4-6 weeks after discharge. Also, please call and make an appointment with your primary care physician as per your usual schedule.   Activity: May return to normal activities as tolerated, however refrain from heavy lifting > 10-15 lbs.  Diet: May continue regular diet.  Medications: Please take all home medications as prescribed by your primary care doctor. Tylenol for pain relief, as needed.  Wound Care: Please, keep wound site clean and dry. You may shower, but do not bathe.  Patient is advised to RETURN TO THE EMERGENCY DEPARTMENT for any of the following - worsening pain, fever/chills, nausea/vomiting, altered mental status, chest pain, shortness of breath, or any other new / worsening symptom. PRINCIPAL DISCHARGE DIAGNOSIS  Diagnosis: Small bowel obstruction  Assessment and Plan of Treatment: Follow up: Please call and make an appointment with Dr. Pedraza 10-14 days after discharge, and with the gastroenterollogist Dr. Roberto 4-6 weeks after discharge. Also, please call and make an appointment with your primary care physician as per your usual schedule.   Activity: May return to normal activities as tolerated, however refrain from heavy lifting > 10-15 lbs.  Diet: Resume pre-hospital diet.   Medications: Please take all home medications as prescribed by your primary care doctor. Tylenol for pain relief, as needed.  Wound Care: Please, keep wound site clean and dry. You may shower, but do not bathe.  Patient is advised to RETURN TO THE EMERGENCY DEPARTMENT for any of the following - worsening pain, fever/chills, nausea/vomiting, altered mental status, chest pain, shortness of breath, or any other new / worsening symptom.

## 2019-08-09 NOTE — DISCHARGE NOTE NURSING/CASE MANAGEMENT/SOCIAL WORK - NSDCDPATPORTLINK_GEN_ALL_CORE
You can access the [facility] Patient Portal, offered by [facility], by registering with the following website: [email or web address]

## 2019-08-09 NOTE — PROGRESS NOTE ADULT - ASSESSMENT
55 yo M POD2 s/p Ex Lap w/ CHIP, doing well post-operatively    -Advance diet as tolerates  -Continue to Monitor midline incision  -Pain control  -Monitor VS  -Monitor I's and O's   -GI c/s - will do capsule endoscopy as OP

## 2019-08-09 NOTE — PROGRESS NOTE ADULT - ATTENDING COMMENTS
feels well. tolerating clears well. +bowel function. abdomen soft, incision clean and intact, minimal tenderness, no guarding, no rebound.    patient offerred regular diet but patient prefers just liquids. advance to full liquids.  if tolerates, discharge to home.

## 2019-08-13 LAB — IBD AB 7 PNL SER: SIGNIFICANT CHANGE UP

## 2019-10-21 ENCOUNTER — APPOINTMENT (OUTPATIENT)
Dept: GASTROENTEROLOGY | Facility: CLINIC | Age: 54
End: 2019-10-21
Payer: COMMERCIAL

## 2019-10-21 VITALS
OXYGEN SATURATION: 98 % | RESPIRATION RATE: 15 BRPM | WEIGHT: 124 LBS | SYSTOLIC BLOOD PRESSURE: 111 MMHG | HEIGHT: 67 IN | BODY MASS INDEX: 19.46 KG/M2 | HEART RATE: 96 BPM | DIASTOLIC BLOOD PRESSURE: 71 MMHG

## 2019-10-21 DIAGNOSIS — K56.609 UNSPECIFIED INTESTINAL OBSTRUCTION, UNSPECIFIED AS TO PARTIAL VERSUS COMPLETE OBSTRUCTION: ICD-10-CM

## 2019-10-21 PROCEDURE — 99215 OFFICE O/P EST HI 40 MIN: CPT

## 2019-10-21 RX ORDER — PHENYLEPHRINE HCL 10 MG
7 TABLET ORAL DAILY
Qty: 10 | Refills: 0 | Status: DISCONTINUED | COMMUNITY
Start: 2018-08-23 | End: 2019-10-21

## 2019-10-23 NOTE — ED ADULT NURSE NOTE - CCCP TRG CHIEF CMPLNT
Quality 431: Preventive Care And Screening: Unhealthy Alcohol Use - Screening: Patient screened for unhealthy alcohol use using a single question and scores less than 2 times per year Quality 110: Preventive Care And Screening: Influenza Immunization: Influenza Immunization Administered during Influenza season Quality 226: Preventive Care And Screening: Tobacco Use: Screening And Cessation Intervention: Patient screened for tobacco use and is an ex/non-smoker Quality 131: Pain Assessment And Follow-Up: Pain assessment using a standardized tool is documented as negative, no follow-up plan required Detail Level: Detailed Quality 130: Documentation Of Current Medications In The Medical Record: Current Medications Documented abdominal pain

## 2019-11-20 ENCOUNTER — OUTPATIENT (OUTPATIENT)
Dept: OUTPATIENT SERVICES | Facility: HOSPITAL | Age: 54
LOS: 1 days | Discharge: HOME | End: 2019-11-20
Payer: COMMERCIAL

## 2019-11-20 ENCOUNTER — RESULT REVIEW (OUTPATIENT)
Age: 54
End: 2019-11-20

## 2019-11-20 ENCOUNTER — APPOINTMENT (OUTPATIENT)
Dept: GASTROENTEROLOGY | Facility: GI CENTER | Age: 54
End: 2019-11-20
Payer: COMMERCIAL

## 2019-11-20 DIAGNOSIS — Z12.11 ENCOUNTER FOR SCREENING FOR MALIGNANT NEOPLASM OF COLON: ICD-10-CM

## 2019-11-20 DIAGNOSIS — K64.8 OTHER HEMORRHOIDS: ICD-10-CM

## 2019-11-20 DIAGNOSIS — Z98.890 OTHER SPECIFIED POSTPROCEDURAL STATES: Chronic | ICD-10-CM

## 2019-11-20 DIAGNOSIS — F17.210 NICOTINE DEPENDENCE, CIGARETTES, UNCOMPLICATED: ICD-10-CM

## 2019-11-20 DIAGNOSIS — K56.0 PARALYTIC ILEUS: ICD-10-CM

## 2019-11-20 DIAGNOSIS — K51.40 INFLAMMATORY POLYPS OF COLON W/OUT COMPLICATIONS: ICD-10-CM

## 2019-11-20 DIAGNOSIS — K63.5 POLYP OF COLON: ICD-10-CM

## 2019-11-20 PROCEDURE — G0121: CPT

## 2019-11-20 PROCEDURE — 88305 TISSUE EXAM BY PATHOLOGIST: CPT

## 2019-11-20 PROCEDURE — 45380 COLONOSCOPY AND BIOPSY: CPT

## 2019-11-20 PROCEDURE — 88305 TISSUE EXAM BY PATHOLOGIST: CPT | Mod: 26

## 2019-11-20 NOTE — PHYSICAL EXAM
[General Appearance - Alert] : alert [General Appearance - In No Acute Distress] : in no acute distress [General Appearance - Well Nourished] : well nourished [General Appearance - Well Developed] : well developed [Sclera] : the sclera and conjunctiva were normal [Outer Ear] : the ears and nose were normal in appearance [Neck Appearance] : the appearance of the neck was normal [Neck Cervical Mass (___cm)] : no neck mass was observed [Respiration, Rhythm And Depth] : normal respiratory rhythm and effort [Exaggerated Use Of Accessory Muscles For Inspiration] : no accessory muscle use [Auscultation Breath Sounds / Voice Sounds] : lungs were clear to auscultation bilaterally [Apical Impulse] : the apical impulse was normal [Heart Rate And Rhythm] : heart rate was normal and rhythm regular [Heart Sounds] : normal S1 and S2 [Bowel Sounds] : normal bowel sounds [Abdomen Soft] : soft [Abdomen Tenderness] : non-tender [Skin Color & Pigmentation] : normal skin color and pigmentation [Skin Turgor] : normal skin turgor [] : no rash [Oriented To Time, Place, And Person] : oriented to person, place, and time [Impaired Insight] : insight and judgment were intact [Affect] : the affect was normal

## 2019-11-22 ENCOUNTER — OTHER (OUTPATIENT)
Age: 54
End: 2019-11-22

## 2019-11-22 LAB — SURGICAL PATHOLOGY STUDY: SIGNIFICANT CHANGE UP

## 2019-11-22 NOTE — PROCEDURE
[Colon Cancer Screening] : colon cancer screening [Procedure Explained] : The procedure was explained [Allergies Reviewed] : allergies reviewed. [Risks] : Risks [Benefits] : benefits [Alternatives] : alternatives [Bleeding] : bleeding risk [Infection] : risk of infection [Consent Obtained] : written consent was obtained prior to the procedure and is detailed in the patient's record [Patient] : the patient [Bowel Prep Kit] : the patient took the appropriate bowel preparation kit as directed [Approved Diet Followed] : the patient avoided solid foods and adhered to the approved diet list for 24 hours prior to the procedure [Propofol ___ mg IV] : Propofol [unfilled] ~Umg intravenously [2] : 2 [Sedation Clearance] : the patient was cleared for moderate sedation [Prep Qualtiy: ___] : Prep Quality:  [unfilled] [Withdrawal Time: ___] : Withdrawal Time:  [unfilled] [Performed By: ___] : Performed by:  TALHA [Left Lateral Decubitus] : The patient was positioned in the left lateral decubitus position [Cecum (Landmarks/Transillum)] : and guided to the cecum which was identified by the anatomic landmarks of the appendiceal orifice and ileocecal valve and by transillumination in the right lower quadrant [Insufflated] : insufflated [Single Pass Needed] : after a single pass [Retroflex View] : a retroflex view of the rectum was performed [Normal] : Normal [Hemorrhoids] : hemorrhoids [Polyps] : polyps [Sent to Pathology] : was sent to pathology for analysis [Tolerated Well] : the patient tolerated the procedure well [No Complications] : There were no complications [Pain] : the patient complained of significant pain [Abnormal Rectum] : a normal rectum [External Hemorrhoids] : no external hemorrhoids [Patient Rotated Into Alternating Positions] : the patient was not rotated [de-identified] : [id number] [de-identified] :  There were three 5 mm sessile polyps removed with cold forceps.

## 2019-11-22 NOTE — ASSESSMENT
[FreeTextEntry1] : A/P\par screening colon\par hemorrhoids\par 3 small polyps\par call in one week for polyp results\par colonoscopy in 5 years\par Addendum- pathology showed hyperplastic polyps. Pt will have colonoscopy in 10 years

## 2019-11-22 NOTE — PROCEDURE
[Colon Cancer Screening] : colon cancer screening [Procedure Explained] : The procedure was explained [Allergies Reviewed] : allergies reviewed. [Risks] : Risks [Benefits] : benefits [Alternatives] : alternatives [Bleeding] : bleeding risk [Infection] : risk of infection [Consent Obtained] : written consent was obtained prior to the procedure and is detailed in the patient's record [Patient] : the patient [Bowel Prep Kit] : the patient took the appropriate bowel preparation kit as directed [Approved Diet Followed] : the patient avoided solid foods and adhered to the approved diet list for 24 hours prior to the procedure [Propofol ___ mg IV] : Propofol [unfilled] ~Umg intravenously [2] : 2 [Sedation Clearance] : the patient was cleared for moderate sedation [Prep Qualtiy: ___] : Prep Quality:  [unfilled] [Withdrawal Time: ___] : Withdrawal Time:  [unfilled] [Performed By: ___] : Performed by:  TALHA [Left Lateral Decubitus] : The patient was positioned in the left lateral decubitus position [Cecum (Landmarks/Transillum)] : and guided to the cecum which was identified by the anatomic landmarks of the appendiceal orifice and ileocecal valve and by transillumination in the right lower quadrant [Insufflated] : insufflated [Single Pass Needed] : after a single pass [Retroflex View] : a retroflex view of the rectum was performed [Normal] : Normal [Hemorrhoids] : hemorrhoids [Polyps] : polyps [Sent to Pathology] : was sent to pathology for analysis [Tolerated Well] : the patient tolerated the procedure well [No Complications] : There were no complications [Pain] : the patient complained of significant pain [Abnormal Rectum] : a normal rectum [External Hemorrhoids] : no external hemorrhoids [Patient Rotated Into Alternating Positions] : the patient was not rotated [de-identified] : [id number] [de-identified] :  There were three 5 mm sessile polyps removed with cold forceps.

## 2019-12-08 NOTE — ED STATDOCS - DATA REVIEWED, MDM
Advance Care Planning (ACP) Provider Conversation Snapshot    Date of ACP Conversation: 12/03/19  Persons included in Conversation:  patient and wife  Length of ACP Conversation in minutes:  <16 minutes (Non-Billable)    Authorized Decision Maker (if patient is incapable of making informed decisions): This person is:   Patient has an advanced care plan. Wife will bring this in for scanning into the EMR chart. For Patients with Decision Making Capacity:   Values/Goals: Exploration of values, goals, and preferences if recovery is not expected, even with continued medical treatment in the event of:  Imminent death  Severe, permanent brain injury  \"In these circumstances, what matters most to you? \"  Care focused more on comfort or quality of life.     Conversation Outcomes / Follow-Up Plan:   Recommended communicating the plan and making copies for the healthcare agent, personal physician, and others as appropriate (e.g., health system)     Irina Wiggins MD
vital signs

## 2019-12-27 NOTE — ASSESSMENT
[FreeTextEntry1] : A/P\par  Patient with SBO- unclear etiology. Pt only other surgery was left inguinal hernia repair. No symptoms consistant with IBD. IBD panel negative.\par    Will do colonoscopy ( screening )\par  I discussed the risks and benefits of colonoscopy and patient was given opportunity to ask questions. Colonoscopy to r/o colon cancer, polyps, AVM's. Patient is medically optimized for the procedure\par - SBO- will do CTE after colonoscopy\par miralax prep

## 2019-12-27 NOTE — PHYSICAL EXAM
[General Appearance - Alert] : alert [General Appearance - In No Acute Distress] : in no acute distress [Sclera] : the sclera and conjunctiva were normal [Outer Ear] : the ears and nose were normal in appearance [Hearing Threshold Finger Rub Not [place]] : hearing was normal [Both Tympanic Membranes Were Examined] : both tympanic membranes were normal [Neck Appearance] : the appearance of the neck was normal [Neck Cervical Mass (___cm)] : no neck mass was observed [Respiration, Rhythm And Depth] : normal respiratory rhythm and effort [Exaggerated Use Of Accessory Muscles For Inspiration] : no accessory muscle use [Auscultation Breath Sounds / Voice Sounds] : lungs were clear to auscultation bilaterally [Apical Impulse] : the apical impulse was normal [Heart Rate And Rhythm] : heart rate was normal and rhythm regular [Heart Sounds] : normal S1 and S2 [Murmurs] : no murmurs [Bowel Sounds] : normal bowel sounds [Abdomen Soft] : soft [Abdomen Tenderness] : non-tender [Skin Color & Pigmentation] : normal skin color and pigmentation [Skin Turgor] : normal skin turgor [] : no rash [Oriented To Time, Place, And Person] : oriented to person, place, and time [Impaired Insight] : insight and judgment were intact [Affect] : the affect was normal [Mood] : the mood was normal [FreeTextEntry1] : small 2 inch verticle scar below the umbilcus

## 2019-12-27 NOTE — HISTORY OF PRESENT ILLNESS
[de-identified] : Patient is here for follow up of small bowel obstruction. Followup 45 minutes. I reviewed CT images, blood work and progress Notes.\par       I saw patient in consult. Patient is healthy male with a history of a left inguinal hernia repair years ago. Patient came in with intermittent left lower quadrant pain. He was originally seen in the emergency room and thought to have a small bowel obstruction. He was passing gas and bowel movements he was discharged. However the pain worsened and he went back to the emergency room. CAT scan this time showed a transition zone in the right hemiabdomen. As per the surgeons patient had some pericolonic fat stranding. He required lysis of adhesions and exploratory lap surgery. His IBD panel was negative. Patient has no history of chronic diarrhea constipation or weight loss. The most elevated his WBCs became was 11.95. CMP was normal. Patient has no history of colon cancer or polyps. he has no pain since discharge. He has never had colonoscopy

## 2020-01-07 NOTE — ED ADULT NURSE NOTE - TEMPLATE LIST FOR HEAD TO TOE ASSESSMENT
SUBJECTIVE:    Gale Lemos is a 17 year old female who presents with intermittent cough, nasal congestion, sore throat, fever, facial pain, myalgias and abdominal cramping.  Onset 3 days ago and the symptoms have been persistent and unchanged.  She has no history of significant recurrent respiratory illnesses.  She is a non-smoker.  Gale denies having vomiting/diarrhea.  After leaving her tampon for longer in the normally she noticed vaginal discomfort and slight discharge without other.  She also noticed suprapubic area tenderness.  Denied to have any urinary symptoms.        OBJECTIVE:    Visit Vitals  /58 (BP Location: RUE - Right upper extremity, Patient Position: Sitting, Cuff Size: Regular)   Pulse 105   Temp 99 °F (37.2 °C) (Tympanic)   Wt 71.5 kg   LMP 12/29/2019       General Appearance:  Healthy, in no distress  Skin:  Skin color, texture, turgor are normal. There are no bruises, rashes or lesions.  Nose:  mucosal edema and nasal congestion bilaterally  Ears:  External ears normal. Canals clear. Tympanic membranes are clear with all landmarks noted.  Throat:  mild erythema noted and no exudates.  Neck:  supple, no lymphadenopathy.  Lungs:  clear to auscultation.  Cardiac:  regular rate and rhythm, no rubs, click gallops or murmurs  Abdomen:  Soft not tender on superficial palpation.  Deep palpation review of minimal discomfort in suprapubic region.  Bowel sounds were normal.  There was no rebound or guarding.  No organomegaly  Pelvic:  External genitalia appear to be normal with minimal irritation.  Vaginal vault with increased amount of discharge white in color.  Minimal erythema.  Cervix appeared to be normal.  Bimanual examination revealed no tenderness and adnexal region as well as no tenderness to cervical motion.    Rapid strep testing is negative  Wet prep is negative  UA not able to provide specimen    ASSESSMENT:    URI  Vaginal discomfort    PLAN:   1. Increase fluid intake.  2.  Gargling with salt water to improve sore throat and sinuses lavage.  3. Tylenol or Ibuprofen as needed to control fever or pain.  4. Consider Mucinex to control cough.  5. Follow up in 5-7 days if symptoms are not improving.  6. Return earlier if any further concerns     General

## 2020-07-27 ENCOUNTER — TRANSCRIPTION ENCOUNTER (OUTPATIENT)
Age: 55
End: 2020-07-27

## 2020-09-28 NOTE — PATIENT PROFILE ADULT - LEGAL HELP
Pt arrived to floor ambulatory from home accompanied by his wife. Pt oriented to room. Iv inserted. Admit assessment completed. Nurse call bell within reach. Will continue to monitor  
no

## 2021-05-08 ENCOUNTER — TRANSCRIPTION ENCOUNTER (OUTPATIENT)
Age: 56
End: 2021-05-08

## 2021-06-09 ENCOUNTER — TRANSCRIPTION ENCOUNTER (OUTPATIENT)
Age: 56
End: 2021-06-09

## 2021-06-10 ENCOUNTER — APPOINTMENT (OUTPATIENT)
Dept: DERMATOLOGY | Facility: CLINIC | Age: 56
End: 2021-06-10
Payer: COMMERCIAL

## 2021-06-10 DIAGNOSIS — L30.8 OTHER SPECIFIED DERMATITIS: ICD-10-CM

## 2021-06-10 PROCEDURE — 99072 ADDL SUPL MATRL&STAF TM PHE: CPT

## 2021-06-10 PROCEDURE — 99203 OFFICE O/P NEW LOW 30 MIN: CPT

## 2021-06-10 RX ORDER — TRIAMCINOLONE ACETONIDE 1 MG/G
0.1 OINTMENT TOPICAL
Qty: 1 | Refills: 2 | Status: ACTIVE | COMMUNITY
Start: 2021-06-10 | End: 1900-01-01

## 2021-06-10 NOTE — PHYSICAL EXAM
[Alert] : alert [Oriented x 3] : ~L oriented x 3 [Well Nourished] : well nourished [FreeTextEntry3] : Type V skin\par \par Patient wearing facemask\par \par Hands: Hyperpigmented crusty plaques present on the dorsum of the right one, 2, 3, 4 and left 2, 3, fingers\par Focal hyperpigmented scaly patches present on the palms\par Grouped 2 mm purple papules present on the volar forearms especially the antecubital fossa\par Note: Legs are very dry\par

## 2021-06-10 NOTE — HISTORY OF PRESENT ILLNESS
[FreeTextEntry1] : Itchy rash [de-identified] : First visit for 56-year-old [facility] male with a one-month history of an itchy rash on hands and arms. Treated at a walk-in clinic 2 weeks ago with a Medrol Dosepak and p.o. Benadryl with temporary improvement.  Seen again on 6/9/2021-same treatment prescribed but patient did not take it. No topical treatment.  \par Note: Patient works as a .

## 2021-07-26 ENCOUNTER — APPOINTMENT (OUTPATIENT)
Dept: DERMATOLOGY | Facility: CLINIC | Age: 56
End: 2021-07-26

## 2021-11-25 ENCOUNTER — EMERGENCY (EMERGENCY)
Facility: HOSPITAL | Age: 56
LOS: 1 days | Discharge: HOME | End: 2021-11-25
Attending: STUDENT IN AN ORGANIZED HEALTH CARE EDUCATION/TRAINING PROGRAM
Payer: COMMERCIAL

## 2021-11-25 VITALS
WEIGHT: 134.92 LBS | TEMPERATURE: 98 F | DIASTOLIC BLOOD PRESSURE: 69 MMHG | SYSTOLIC BLOOD PRESSURE: 122 MMHG | OXYGEN SATURATION: 98 % | RESPIRATION RATE: 18 BRPM | HEIGHT: 67 IN | HEART RATE: 114 BPM

## 2021-11-25 DIAGNOSIS — Z98.890 OTHER SPECIFIED POSTPROCEDURAL STATES: Chronic | ICD-10-CM

## 2021-11-25 LAB
ALBUMIN SERPL ELPH-MCNC: 4.2 G/DL — SIGNIFICANT CHANGE UP (ref 3.3–5.2)
ALP SERPL-CCNC: 72 U/L — SIGNIFICANT CHANGE UP (ref 40–120)
ALT FLD-CCNC: 14 U/L — SIGNIFICANT CHANGE UP
ANION GAP SERPL CALC-SCNC: 14 MMOL/L — SIGNIFICANT CHANGE UP (ref 5–17)
AST SERPL-CCNC: 20 U/L — SIGNIFICANT CHANGE UP
BASOPHILS # BLD AUTO: 0 K/UL — SIGNIFICANT CHANGE UP (ref 0–0.2)
BASOPHILS NFR BLD AUTO: 0 % — SIGNIFICANT CHANGE UP (ref 0–2)
BILIRUB SERPL-MCNC: 0.2 MG/DL — LOW (ref 0.4–2)
BUN SERPL-MCNC: 14.3 MG/DL — SIGNIFICANT CHANGE UP (ref 8–20)
CALCIUM SERPL-MCNC: 9.4 MG/DL — SIGNIFICANT CHANGE UP (ref 8.6–10.2)
CHLORIDE SERPL-SCNC: 103 MMOL/L — SIGNIFICANT CHANGE UP (ref 98–107)
CO2 SERPL-SCNC: 23 MMOL/L — SIGNIFICANT CHANGE UP (ref 22–29)
CREAT SERPL-MCNC: 0.98 MG/DL — SIGNIFICANT CHANGE UP (ref 0.5–1.3)
CRP SERPL-MCNC: <4 MG/L — SIGNIFICANT CHANGE UP
EOSINOPHIL # BLD AUTO: 1.91 K/UL — HIGH (ref 0–0.5)
EOSINOPHIL NFR BLD AUTO: 12.2 % — HIGH (ref 0–6)
ERYTHROCYTE [SEDIMENTATION RATE] IN BLOOD: 11 MM/HR — SIGNIFICANT CHANGE UP (ref 0–20)
GIANT PLATELETS BLD QL SMEAR: PRESENT — SIGNIFICANT CHANGE UP
GLUCOSE SERPL-MCNC: 100 MG/DL — HIGH (ref 70–99)
HCT VFR BLD CALC: 42.5 % — SIGNIFICANT CHANGE UP (ref 39–50)
HGB BLD-MCNC: 14.7 G/DL — SIGNIFICANT CHANGE UP (ref 13–17)
LYMPHOCYTES # BLD AUTO: 21.7 % — SIGNIFICANT CHANGE UP (ref 13–44)
LYMPHOCYTES # BLD AUTO: 3.39 K/UL — HIGH (ref 1–3.3)
MANUAL SMEAR VERIFICATION: SIGNIFICANT CHANGE UP
MCHC RBC-ENTMCNC: 31.2 PG — SIGNIFICANT CHANGE UP (ref 27–34)
MCHC RBC-ENTMCNC: 34.6 GM/DL — SIGNIFICANT CHANGE UP (ref 32–36)
MCV RBC AUTO: 90.2 FL — SIGNIFICANT CHANGE UP (ref 80–100)
MONOCYTES # BLD AUTO: 1.09 K/UL — HIGH (ref 0–0.9)
MONOCYTES NFR BLD AUTO: 7 % — SIGNIFICANT CHANGE UP (ref 2–14)
NEUTROPHILS # BLD AUTO: 8.57 K/UL — HIGH (ref 1.8–7.4)
NEUTROPHILS NFR BLD AUTO: 54.8 % — SIGNIFICANT CHANGE UP (ref 43–77)
PLAT MORPH BLD: NORMAL — SIGNIFICANT CHANGE UP
PLATELET # BLD AUTO: 277 K/UL — SIGNIFICANT CHANGE UP (ref 150–400)
POTASSIUM SERPL-MCNC: 4.1 MMOL/L — SIGNIFICANT CHANGE UP (ref 3.5–5.3)
POTASSIUM SERPL-SCNC: 4.1 MMOL/L — SIGNIFICANT CHANGE UP (ref 3.5–5.3)
PROT SERPL-MCNC: 7.4 G/DL — SIGNIFICANT CHANGE UP (ref 6.6–8.7)
RBC # BLD: 4.71 M/UL — SIGNIFICANT CHANGE UP (ref 4.2–5.8)
RBC # FLD: 13.5 % — SIGNIFICANT CHANGE UP (ref 10.3–14.5)
RBC BLD AUTO: NORMAL — SIGNIFICANT CHANGE UP
SODIUM SERPL-SCNC: 140 MMOL/L — SIGNIFICANT CHANGE UP (ref 135–145)
VARIANT LYMPHS # BLD: 4.3 % — SIGNIFICANT CHANGE UP (ref 0–6)
WBC # BLD: 15.63 K/UL — HIGH (ref 3.8–10.5)
WBC # FLD AUTO: 15.63 K/UL — HIGH (ref 3.8–10.5)

## 2021-11-25 PROCEDURE — 87205 SMEAR GRAM STAIN: CPT

## 2021-11-25 PROCEDURE — 73130 X-RAY EXAM OF HAND: CPT | Mod: 26,RT

## 2021-11-25 PROCEDURE — 87186 SC STD MICRODIL/AGAR DIL: CPT

## 2021-11-25 PROCEDURE — 84550 ASSAY OF BLOOD/URIC ACID: CPT

## 2021-11-25 PROCEDURE — 86140 C-REACTIVE PROTEIN: CPT

## 2021-11-25 PROCEDURE — 10060 I&D ABSCESS SIMPLE/SINGLE: CPT

## 2021-11-25 PROCEDURE — 99284 EMERGENCY DEPT VISIT MOD MDM: CPT | Mod: 25

## 2021-11-25 PROCEDURE — 85025 COMPLETE CBC W/AUTO DIFF WBC: CPT

## 2021-11-25 PROCEDURE — 73130 X-RAY EXAM OF HAND: CPT

## 2021-11-25 PROCEDURE — 80053 COMPREHEN METABOLIC PANEL: CPT

## 2021-11-25 PROCEDURE — 87070 CULTURE OTHR SPECIMN AEROBIC: CPT

## 2021-11-25 PROCEDURE — 85652 RBC SED RATE AUTOMATED: CPT

## 2021-11-25 PROCEDURE — 36415 COLL VENOUS BLD VENIPUNCTURE: CPT

## 2021-11-25 PROCEDURE — 96374 THER/PROPH/DIAG INJ IV PUSH: CPT | Mod: XU

## 2021-11-25 RX ORDER — CEFAZOLIN SODIUM 1 G
1000 VIAL (EA) INJECTION ONCE
Refills: 0 | Status: COMPLETED | OUTPATIENT
Start: 2021-11-25 | End: 2021-11-25

## 2021-11-25 RX ORDER — CEPHALEXIN 500 MG
1 CAPSULE ORAL
Qty: 40 | Refills: 0
Start: 2021-11-25 | End: 2021-12-04

## 2021-11-25 RX ADMIN — Medication 100 MILLIGRAM(S): at 21:53

## 2021-11-25 NOTE — CONSULT NOTE ADULT - SUBJECTIVE AND OBJECTIVE BOX
Pt Name: NETTE RICHMOND  MRN: [id number]    Patient is a 56y Male presenting to the emergency department with a chief complaint of right 3rd digit swelling/pain x 2 days. 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 Felt fine until 2 days ago when he felt pain and swelling at 3rd digit. Most of pain is localized to dorsal aspect of 3rd digit, distal to DIP. Denies fevers. Denies acute sensory/motor changes. no other ortho complaints at this time. Denies discharge from finger.     REVIEW OF SYSTEMS  General: Alert, responsive, in NAD  Skin/Breast: No rashes, no pruritis 	  Ophthalmologic: No visual changes. No redness. 	  ENMT:	No discharge. No swelling.  Respiratory and Thorax: No difficulty breathing. No cough.	   Cardiovascular:	No chest pain. No palpitations.  Gastrointestinal:	 No abdominal pain. No diarrhea.   Genitourinary: No dysuria. No bleeding.  Musculoskeletal: SEE HPI.  Neurological: No sensory or motor changes.   ROS is otherwise negative.    PAST MEDICAL & SURGICAL HISTORY:  PAST MEDICAL & SURGICAL HISTORY:  No pertinent past medical history    S/P left inguinal hernia repair    Allergies: Milk (Rash; Urticaria)  No Known Drug Allergies    Medications:     FAMILY HISTORY:  Family history of hypertension    : non-contributory    Social History:     Ambulation: Walking independently                          14.7   15.63 )-----------( 277      ( 25 Nov 2021 20:43 )             42.5       11-25    140  |  103  |  14.3  ----------------------------<  100<H>  4.1   |  23.0  |  0.98    Ca    9.4      25 Nov 2021 20:43    TPro  7.4  /  Alb  4.2  /  TBili  0.2<L>  /  DBili  x   /  AST  20  /  ALT  14  /  AlkPhos  72  11-25      Vital Signs Last 24 Hrs  T(C): 36.9 (25 Nov 2021 19:23), Max: 36.9 (25 Nov 2021 19:23)  T(F): 98.4 (25 Nov 2021 19:23), Max: 98.4 (25 Nov 2021 19:23)  HR: 114 (25 Nov 2021 19:23) (114 - 114)  BP: 122/69 (25 Nov 2021 19:23) (122/69 - 122/69)  BP(mean): --  RR: 18 (25 Nov 2021 19:23) (18 - 18)  SpO2: 98% (25 Nov 2021 19:23) (98% - 98%)    Daily Height in cm: 170.18 (25 Nov 2021 19:23)    Daily     PHYSICAL EXAM:  Appearance: Alert, responsive, in no acute distress.    RUE:  Skin: TTP at dorsal aspect of 3rd digit distal to DIP. Fluctuance noted at dorsal aspect of 3rd digit distal to DIP. No draining/bleeding noted. Diffuse swelling noted at entirety of digit. No TTP along flexor tendon sheath. Minimal flexion of DIP/PIP due to swelling. Reports pain at fluctuance when attempting to flex digit.   Exam of digits 1,2,4,5 WNL.    Neurological: Sensation is grossly intact to light touch.  Vascular: 2+ distal pulses. Cap refill < 2 sec. No signs of venous insufficiency or stasis. No extremity ulcerations. No cyanosis.    Imaging Studies:    A/P:  Pt is a 56y Male with right 3rd digit swelling/pain x 2 days.     PLAN:   * incision and drainage by ED  * f/u cultures of incision and drainage   * CRP <4, f/u ESR and uric acid  * One dose of IV abx to be ordered  * BID betadine soaks  * d/c on PO abx  * f/u within 1 week w/ Dr. Fleming  * case and imaging d/w Dr. BENJAMIN

## 2021-11-25 NOTE — ED PROVIDER NOTE - NSFOLLOWUPINSTRUCTIONS_ED_ALL_ED_FT
1. Daily Soaks twice daily with Betadine as discussed   2 Take Medication : Keflex 1 pill every 6 hrs as instructed  3. Follow-up with Hand Surgeon in a week.

## 2021-11-25 NOTE — ED PROVIDER NOTE - PROGRESS NOTE DETAILS
Hand X-ray ordered and no gas noted on film.   Labs ordered and Orthopedic PA called for consultation. Ortho PA after consultation with Dr. Fleming recommended I &D, Ancef and twice daily soaks and F/U  with Dr. Ribera in 1 week. Rx Keflex sent to Pt Pharmacy.

## 2021-11-25 NOTE — ED PROVIDER NOTE - WR ORDER NAME 1
Patient is 82 year old female admitted with history of HTN, CAD, CABG, DVT, CKD presents for routine transfusion.
Xray Hand 3 Views, Right

## 2021-11-25 NOTE — ED ADULT NURSE NOTE - OBJECTIVE STATEMENT
Assumed care of the Pt AOx4 in no acute distress. 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 Pt finger swollen, Pt complaints of pain when moving the finger. Pt VSS, breathing even and unlabored. labs sent as per orders pt educated on plan of care, pt able to successfully teach back plan of care to RN, RN will continue to reeducate pt during hospital stay.

## 2021-11-25 NOTE — ED PROVIDER NOTE - OBJECTIVE STATEMENT
56 yr old M presented to ED with right middle finger pain x 3 days. 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 Pt admits to pain and swelling when moving his finger. Pt admits to pain but denies any fever, chills or numbness to his finger. Pt denies any hx of DM, HTN or any other medical illness.

## 2021-11-25 NOTE — ED PROVIDER NOTE - PATIENT PORTAL LINK FT
You can access the FollowMyHealth Patient Portal offered by [facility] by registering at the following website: [email or web address]. By joining [facility]’s FollowMyHealth portal, you will also be able to view your health information using other applications (apps) compatible with our system.

## 2021-11-25 NOTE — ED PROVIDER NOTE - ATTENDING CONTRIBUTION TO CARE
57yo male with no pmh presents with pain and swelling to the right 3rd digit. Pt last week got a cut on the dorsal distal aspect of this digit. since then with some pain which has been worsening and now the swelling  Const: Awake, alert and oriented. In no acute distress. Well appearing.  HEENT: NC/AT. Moist mucous membranes.  Eyes: No scleral icterus. EOMI.  Neck:. Soft and supple. Full ROM without pain.  Cardiac: Regular rate and regular rhythm. +S1/S2. Peripheral pulses 2+ and symmetric. No LE edema.  Resp: Speaking in full sentences. No evidence of respiratory distress. No wheezes, rales or rhonchi.  Abd: Soft, non-tender, non-distended. Normal bowel sounds in all 4 quadrants. No guarding or rebound.  Msk: Spine midline and non-tender. No CVAT. Right 3rd digit+ swollen on evaluation.  Tender along tendon sheath. Decrease ROM at DIP and in flexed position .   Fluctuation noted dorsal aspect of right 3rd digit and scab noted at same location.  Skin: No rashes, abrasions or lacerations.  Lymph: No cervical lymphadenopathy.  Neuro: Awake, alert & oriented x 3. Moves all extremities symmetrically.  concern for tenosynovits therefore ortho hand consulted,  I&D, abx, follow up

## 2021-11-25 NOTE — ED PROVIDER NOTE - CLINICAL SUMMARY MEDICAL DECISION MAKING FREE TEXT BOX
56 yr old M presented to ED with right middle finger pain x 3 days. 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 Pt admits to pain and swelling when moving his finger. Examination + fluctuance to dorsum of right middle finger. X-ray negative for gas. I&D performed and 1 gram Ancef administered. Rx keflex sent to Pharmacy. F/U with Dr. Ribera in 1 week.

## 2021-11-25 NOTE — ED PROVIDER NOTE - CARE PROVIDER_API CALL
Marcela Fleming)  Plastic Surgery; Surgery of the Hand  [address], [address]  [place] [address]  Phone: [phone]  Fax: [phone]  Follow Up Time: 4-6 Days

## 2021-11-25 NOTE — ED ADULT NURSE NOTE - ALCOHOL PRE SCREEN (AUDIT - C)
Statement Selected Hydroxychloroquine Pregnancy And Lactation Text: This medication has been shown to cause fetal harm but it isn't assigned a Pregnancy Risk Category. There are small amounts excreted in breast milk.

## 2021-11-25 NOTE — ED PROCEDURE NOTE - CPROC ED TIME OUT STATEMENT1
“Patient's name, , procedure and correct site were confirmed during the [place] Timeout.”

## 2021-11-25 NOTE — ED PROVIDER NOTE - PHYSICAL EXAMINATION
Right Middle finger + swollen on evaluation.  Tender along tendon sheath. Decrease ROM at DIP and in flexed position .   Fluctuation noted no dorsum of middle finger and scab noted at same location.

## 2021-11-27 LAB
-  AMPICILLIN/SULBACTAM: SIGNIFICANT CHANGE UP
-  CEFAZOLIN: SIGNIFICANT CHANGE UP
-  CLINDAMYCIN: SIGNIFICANT CHANGE UP
-  ERYTHROMYCIN: SIGNIFICANT CHANGE UP
-  GENTAMICIN: SIGNIFICANT CHANGE UP
-  OXACILLIN: SIGNIFICANT CHANGE UP
-  PENICILLIN: SIGNIFICANT CHANGE UP
-  RIFAMPIN: SIGNIFICANT CHANGE UP
-  TETRACYCLINE: SIGNIFICANT CHANGE UP
-  TRIMETHOPRIM/SULFAMETHOXAZOLE: SIGNIFICANT CHANGE UP
-  VANCOMYCIN: SIGNIFICANT CHANGE UP
METHOD TYPE: SIGNIFICANT CHANGE UP

## 2021-11-30 LAB
CULTURE RESULTS: SIGNIFICANT CHANGE UP
ORGANISM # SPEC MICROSCOPIC CNT: SIGNIFICANT CHANGE UP
ORGANISM # SPEC MICROSCOPIC CNT: SIGNIFICANT CHANGE UP
SPECIMEN SOURCE: SIGNIFICANT CHANGE UP

## 2021-12-10 ENCOUNTER — TRANSCRIPTION ENCOUNTER (OUTPATIENT)
Age: 56
End: 2021-12-10

## 2022-01-16 NOTE — ED STATDOCS - NSTIMEPROVIDERCAREINITIATE_GEN_ER
Chief Complaint   Patient presents with   • Cough     Pt mother reports cough, runny nose, red face, loss of appetite, poking at ears, voice changes, starting Friday       PPE utilized for this face-to face office visit:  Examiner wearing a N95, goggles,  surgical mask.      Visit Vitals  Pulse 134   Temp 98.2 °F (36.8 °C) (Skin)   Resp 30   Wt (!) 13.7 kg (30 lb 1.6 oz)   SpO2 99%   BMI 17.99 kg/m²     HISTORY OF PRESENT ILLNESS       Juan is a 15 month old male here for the above concerns.  Dad and mom states that on Friday he was noted to have a hoarse voice and nasal congestion, runny nose and has developed a cough that does sound harsh, also seems to be worse in the evening.  He does seem to have some audible chest congestion as well with coughing.  He has not had any fevers, has not been pulling at the ears, has been eating a little bit last but drinking adequate fluids and has normal urine output.  Energy level seems slightly decreased but overall stable.  No vomiting, diarrhea, did have 3 bowel movements yesterday but non diarrhea bowel movements with no black stool or blood in the stool.    No visible shortness of breath, wheezing, grunting or stridor.  He did develop a red facial rash today on both cheeks and to a lesser degree his forehead.    No sick contacts at home, no , no secondhand smoke exposure; he was seen in urgent care December 26th and diagnosed with upper respiratory infection, again reported a wheezy or barky sounding cough and at that point had a normal exam, COVID-19/flu/RSV test was negative.  He was given amoxicillin twice daily which was completed on January 9th.  He then saw his pediatrician for a well-child exam January 11th and normal ear exam was documented.  He did receive MMR, varicella, influenza #1.    PAST MEDICAL, FAMILY AND SOCIAL HISTORY     Previous medical, surgical history, medications and allergies reviewed in EHR.     REVIEW OF SYSTEMS     Review of systems as  noted in HPI, otherwise negative.     PHYSICAL EXAM       General: Alert, active and energetic child, social smile intact, normal interaction with examiner, dad, mom, exploring room, in NAD.   HEENT:   Eyes- Normal conjunctiva and sclera, no ocular drainage.   Ears- Bilateral TM dull, on the left the TM is erythematous and bulging.  TM intact, bilateral ear canals within normal limits.  Nose- Nasal mucosa pink and moist. Turbinates erythematous, edematous copious cloudy nasal secretions noted.   Oral mucosa: Pink, moist, no oral lesions. Normal dentition. Posterior oropharynx without erythema, normal tonsils, no exudate.  No oral lesions or palatal petechiae.  Neck: Supple, non-tender, trachea midline. No lymphadenopathy.   Cardiac: Heart RRR, extremities warm and pink, no edema.   Pulmonary: Lungs CTA all lobes with good air movement.  He does have a slight cough, not barky in quality; no stridor, grunting, nasal flaring or retracting, no wheezing, respirations non-labored.   Neuro: A&O x 3, no focal deficits.   Skin: W/D/I, bilateral cheeks erythematous, no vesicles or ulcerations.    ASSESSMENT/PLAN     Cough  (primary encounter diagnosis)  Runny nose  Non-recurrent acute suppurative otitis media of left ear without spontaneous rupture of tympanic membrane       Walk In on 01/16/2022   Component Date Value Ref Range Status   • Rapid SARS-COV-2 by PCR 01/16/2022 Not Detected  Not Detected / Detected / Presumptive Positive / Inhibitors present Final   • Influenza A by PCR 01/16/2022 Not Detected  Not Detected Final   • Influenza B by PCR 01/16/2022 Not Detected  Not Detected Final   • RSV BY PCR 01/16/2022 Not Detected  Not Detected Final   • Isolation Guidelines 01/16/2022    Final    Do not use this test result as the sole decision-maker for discontinuation of isolation.   Clinical evaluation should be considered for other respiratory illness requiring transmission-based isolation.    -    No fever (<99.0 F/37.2  C) for at least 24 hours without the use of fever-reducing medications    AND  -    Respiratory symptoms have improved or resolved (e.g. cough, shortness of breath)     AND  -    COVID-19 negative test    See COVID-19 Deisolation Resource Guide   • Procedural Comment 01/16/2022    Final    SARS-COV-2 nucleic acid has not been detected indicating the absence of COVID-19.    This test was performed using the [employer] Xpert Xpress SARS-CoV-2/Flu/RSV RT-PCR test that has been given Emergency Use Authorization (EUA) by the [place] Food and Drug Administration (FDA).  These tests are considered definitive and do not need to be confirmed by another method.     Negative for COVID-19, influenza a and B, RSV.    I do suspect that the symptoms he is experiencing are likely viral in nature, however he also does have an acute left-sided otitis media.    Will treat with Omnicef 14 milligram/kilogram daily for 10 days.    Potential side effects, adverse reactions of prescribed medications reviewed.     Stable oxygen level, clear lungs, no labored breathing, retracting, grunting, nasal flaring, stridor.  I did reassure mom that I do not believe he has any evidence of acute pulmonary infection at this time such as pneumonia.  Symptoms and loud breathing are likely related to transmitted upper airway noises and/or nasal congestion.  Discussed nasal saline drops, bulb suction, and use of hot steam treatments, vaporizer in the bedroom, avoid cough and cold medications in children Juan's age, aside from [employer].    RTC or call with new symptoms, changing or worsening symptoms, or failure of symptoms to improve as discussed.    Parents both verbalized understanding and are in agreement with the care plan, no additional questions or concerns.   06-Aug-2019 11:46

## 2022-03-14 ENCOUNTER — TRANSCRIPTION ENCOUNTER (OUTPATIENT)
Age: 57
End: 2022-03-14

## 2022-09-11 ENCOUNTER — EMERGENCY (EMERGENCY)
Facility: HOSPITAL | Age: 57
LOS: 1 days | Discharge: HOME | End: 2022-09-11
Attending: EMERGENCY MEDICINE
Payer: COMMERCIAL

## 2022-09-11 VITALS
HEART RATE: 73 BPM | HEIGHT: 67 IN | OXYGEN SATURATION: 98 % | DIASTOLIC BLOOD PRESSURE: 73 MMHG | SYSTOLIC BLOOD PRESSURE: 106 MMHG | TEMPERATURE: 98 F | WEIGHT: 130.07 LBS | RESPIRATION RATE: 18 BRPM

## 2022-09-11 DIAGNOSIS — Z98.890 OTHER SPECIFIED POSTPROCEDURAL STATES: Chronic | ICD-10-CM

## 2022-09-11 PROCEDURE — 10060 I&D ABSCESS SIMPLE/SINGLE: CPT

## 2022-09-11 PROCEDURE — 99283 EMERGENCY DEPT VISIT LOW MDM: CPT | Mod: 25

## 2022-09-11 RX ADMIN — Medication 100 MILLIGRAM(S): at 02:48

## 2022-09-11 NOTE — ED PROVIDER NOTE - ATTENDING APP SHARED VISIT CONTRIBUTION OF CARE
Small axillary abscess s/p I+D, PO abx, warm compresses, wound checks and return precautions provided.

## 2022-09-11 NOTE — ED PROVIDER NOTE - PATIENT PORTAL LINK FT
You can access the FollowMyHealth Patient Portal offered by [facility] by registering at the following website: [email or web address]. By joining [facility]’s FollowMyHealth portal, you will also be able to view your health information using other applications (apps) compatible with our system.

## 2022-09-11 NOTE — ED PROVIDER NOTE - NS ED ATTENDING STATEMENT MOD
This was a shared visit with the ROCCO. I reviewed and verified the documentation and independently performed the documented:

## 2022-09-11 NOTE — ED PROVIDER NOTE - OBJECTIVE STATEMENT
58 y/o male presents c/o painful swollen area to left axillary area x several days. Also reports a painful swollen area to the left parietal area. no fever, chills, or n/v.

## 2022-09-11 NOTE — ED PROVIDER NOTE - NSFOLLOWUPINSTRUCTIONS_ED_ALL_ED_FT
Abscess    An abscess is an infected area that contains a collection of pus and debris. It can occur in almost any part of the body and occurs when the tissue gets infection. Symptoms include a painful mass that is red, warm, tender that might break open and HAVE drainage. If your health care provider gave you antibiotics make sure to take the full course and do not stop even if feeling better.     SEEK IMMEDIATE MEDICAL CARE IF YOU HAVE ANY OF THE FOLLOWING SYMPTOMS: chills, fever, muscle aches, or red streaking from the area.     Please follow up with your doctor within 48 hours Abscess    An abscess is an infected area that contains a collection of pus and debris. It can occur in almost any part of the body and occurs when the tissue gets infection. Symptoms include a painful mass that is red, warm, tender that might break open and HAVE drainage. If your health care provider gave you antibiotics make sure to take the full course and do not stop even if feeling better.     SEEK IMMEDIATE MEDICAL CARE IF YOU HAVE ANY OF THE FOLLOWING SYMPTOMS: chills, fever, muscle aches, or red streaking from the area.     Please follow up with your doctor within 48 hours  Please follow up with surgery within 4 days

## 2022-09-11 NOTE — ED PROVIDER NOTE - NSFOLLOWUPCLINICS_GEN_ALL_ED_FT
[facility] Acute Care Surgery  Acute Care Surgery  [address] [place] [id number]  Phone: [phone]  Fax:

## 2022-09-13 ENCOUNTER — APPOINTMENT (OUTPATIENT)
Dept: TRAUMA SURGERY | Facility: CLINIC | Age: 57
End: 2022-09-13

## 2022-09-13 VITALS
OXYGEN SATURATION: 99 % | RESPIRATION RATE: 16 BRPM | HEART RATE: 80 BPM | WEIGHT: 136 LBS | DIASTOLIC BLOOD PRESSURE: 66 MMHG | TEMPERATURE: 98.3 F | BODY MASS INDEX: 20.61 KG/M2 | SYSTOLIC BLOOD PRESSURE: 104 MMHG | HEIGHT: 68 IN

## 2022-09-13 DIAGNOSIS — L02.419 CUTANEOUS ABSCESS OF LIMB, UNSPECIFIED: ICD-10-CM

## 2022-09-13 DIAGNOSIS — R22.32 LOCALIZED SWELLING, MASS AND LUMP, LEFT UPPER LIMB: ICD-10-CM

## 2022-09-13 DIAGNOSIS — R22.1 LOCALIZED SWELLING, MASS AND LUMP, NECK: ICD-10-CM

## 2022-09-13 PROCEDURE — 99024 POSTOP FOLLOW-UP VISIT: CPT

## 2022-09-15 PROBLEM — L02.419 ABSCESS, AXILLA: Status: ACTIVE | Noted: 2022-09-13

## 2022-09-15 PROBLEM — R22.1: Status: ACTIVE | Noted: 2022-09-13

## 2022-09-15 PROBLEM — R22.32 MASS OF LEFT AXILLA: Status: ACTIVE | Noted: 2022-09-13

## 2022-09-15 NOTE — ASSESSMENT
[FreeTextEntry1] : Sonogram ordered of  left axilla  and  submental area\par patient  instructed to  abx prescription \par F/u ENT\par Return  to [facility]  clinic  after  sonograms performed\par Discussion with patient   All questions answered  Any acute symptoms and or concerns patient understands  to call back office  and  or  go  directly to [facility] ED\par

## 2022-09-15 NOTE — PHYSICAL EXAM
[Normal Breath Sounds] : Normal breath sounds [Normal Heart Sounds] : normal heart sounds [Purpura] : no purpura  [Petechiae] : no petechiae [Skin Ulcer] : no ulcer [Skin Induration] : no induration [Alert] : alert [Oriented to Person] : oriented to person [Oriented to Place] : oriented to place [Oriented to Time] : oriented to time [Calm] : calm [de-identified] : wdwn  male in  nad [de-identified] : non icteric sclera PERRLA EOMS intact  and  nl [de-identified] : trachea  midline [de-identified] : soft  no guarding [de-identified] : f [de-identified] : left axilla small palapble mass with area of I and D   no active  driange  and  or  bleeding   no edema  no erythematous streaking no additional palpable masses   right  axilla  wnl     submental area  with  visible palpable mass  non  tender  no  visible  puncta   nl speech   from  of  mouth  /mandible /maxilla

## 2022-09-15 NOTE — HISTORY OF PRESENT ILLNESS
[FreeTextEntry1] : Patient presents  to [facility]  clinic  s/p visit to  [facility] ED   due  to  left axilla "abscess"  and  mass  to  submental area   Patient  states in the ED attempted  to I and D left axilla mass  with no  success of any drainage   Patient denies  any  fevers  no  chills  no  pain to left axilla  and  or  under chin area   Patient  denies  any other masses    Patient  did not  get  abx rx from the  ED.

## 2022-09-15 NOTE — REVIEW OF SYSTEMS
[Earache] : no earache [Loss Of Hearing] : no hearing loss [Nosebleeds] : no nosebleeds [Nasal Discharge] : no nasal discharge [Sore Throat] : no sore throat [Hoarseness] : no hoarseness [Negative] : Psychiatric [FreeTextEntry4] : sub mental mass [de-identified] : left axilla  mass/ submental mass

## 2022-10-18 ENCOUNTER — NON-APPOINTMENT (OUTPATIENT)
Age: 57
End: 2022-10-18

## 2023-05-08 ENCOUNTER — NON-APPOINTMENT (OUTPATIENT)
Age: 58
End: 2023-05-08

## 2023-09-13 NOTE — ASU DISCHARGE PLAN (ADULT/PEDIATRIC). - DIET
no change Split-Thickness Skin Graft Text: The defect edges were debeveled with a #15 scalpel blade.  Given the location of the defect, shape of the defect and the proximity to free margins a split thickness skin graft was deemed most appropriate.  Using a sterile surgical marker, the primary defect shape was transferred to the donor site. The split thickness graft was then harvested.  The skin graft was then placed in the primary defect and oriented appropriately.

## 2024-02-28 NOTE — ASU PREOP CHECKLIST - NS PREOP CHK HIBICLENS NA
"Occupational Therapy  Evaluation    Name: Ev Alberts  MRN: [id number]  Admitting Diagnosis: generalized weakness  Recent Surgery: * No surgery found *      Recommendations:     Discharge therapy intensity: Low Intensity Therapy (home with sitters and family assist)   Discharge Equipment Recommendations:  none      Assessment:     Ev Alberts is a 89 y.o. female with a medical diagnosis of generalized weakness, nstemi, urosepsis requiring vasopressor support, R knee YARI, hx glaucoma.  Pt cleared by Dr. Tobias to continue therapy following upgrade to ICU for vasopressor support.  Pt off of vasopressor support during OT session.  She presents with the following performance deficits affecting function: weakness, impaired endurance, impaired self care skills, impaired functional mobility, gait instability, impaired balance, visual deficits, decreased upper extremity function, decreased lower extremity function, pain, decreased ROM.     Rehab Prognosis: Good; patient would benefit from acute skilled OT services to address these deficits and reach maximum level of function.       Plan:     Patient to be seen 3 x/week to address the above listed problems via self-care/home management, therapeutic exercises  Plan of Care Expires: 03/28/24  Plan of Care Reviewed with: patient, family    Subjective     Chief Complaint: "I'm dizzy"  Patient/Family Comments/goals: "go home with caregivers"    Occupational Profile:  Living Environment: lives with  and son and nephew, has caregivers M-F 8-2, alone for approx 2 hours, son or nephew arrives home and assists at night and on weekends.  Previous level of function: assist required for ADLs for toileting (wears pads and underwear), toilet t/f (furniture walks to toilet due to limited space in bathroom and not w/c accessible, bathing on tub bench, assist as well for dressing tasks and setting up food to feed self.  Pt participates in tasks.  Lately has primarily stand-stepped " transfer to w/c, limited ambulation at home  Roles and Routines: wife, mother  Equipment Used at Home:  (pt has RW, rollator, BSC, elevated toilet, grab bars, tub bench)  Assistance upon Discharge: family looking into getting more caregivers during the weekend    Pain/Comfort:  Pain Rating 1:  (pt with chronic knee pain, pain from healing pressure sore on sacrum)  Pain Addressed 1: Reposition    Patients cultural, spiritual, Nondenominational conflicts given the current situation:      Objective:     OT communicated with RN prior to session.      Patient was found HOB elevated with  (arterial line, vital monitoring, SCD, cook, 4L NC) upon OT entry to room.    General Precautions: Standard, fall  Orthopedic Precautions: N/A  Braces: N/A    Vital Signs: 106/56 HR 59, 105/57 HR 60    Bed Mobility:    Patient completed Supine to Sit with maximal assistance  Patient completed Sit to Supine with maximal assistance    Functional Mobility/Transfers:  Patient completed Sit <> Stand Transfer with moderate assistance  with  rolling walker   Functional Mobility: mod A sit<>stand, a few side steps, chronic knee issues.  Daughter reports she needs knee replacements but is not a candidate due to health issues    Activities of Daily Living:  Lower Body Dressing: maximal assistance    Toileting: total assistance joyce      Functional Cognition:  Follows simple commands, hx of dementia    Visual Perceptual Skills:  Hx of glaucoma, wears glasses but not at hospital    Upper Extremity Function:  Right Upper Extremity:   Limited shoulder ROM, 3/5 distal strength    Left Upper Extremity:  Limited shoulder ROM, 3/5 distal strength    Balance:   Min assist static sitting balance EOB    Therapeutic Positioning  Risk for acquired pressure injuries is increased due to relative decrease in mobility d/t hospitalization , impaired mobility, altered skin already present, and h/o skin breakdown.    OT interventions performed during the course of today's  session:   Therapeutic positioning was provided at the conclusion of session to offload all bony prominences for the prevention and/or reduction of pressure injuries    Skin assessment: all bony prominences were assessed    Findings: known area of altered skin integrity at sacrum, healing sore    OT recommendations for therapeutic positioning throughout hospitalization:   Follow [facility] Pressure Injury Prevention Protocol  May benefit from AMARILIS upon downgrade      Patient Education:  Patient provided with verbal education education regarding OT role/goals/POC, fall prevention, and pressure ulcer prevention.  Understanding was verbalized, however additional teaching warranted.     Patient left right sidelying with all lines intact and family present    GOALS:   Multidisciplinary Problems       Occupational Therapy Goals          Problem: Occupational Therapy    Goal Priority Disciplines Outcome Interventions   Occupational Therapy Goal     OT, PT/OT Ongoing, Progressing    Description: Goals to be met by: 3/28/24     Patient will increase functional independence with ADLs by performing:    UE Dressing with Moderate Assistance.  Grooming while seated with Stand-by Assistance.  Sitting at edge of bed x20 minutes with Stand-by Assistance.  Toilet transfer to toilet with Minimal Assistance.  Increased functional strength to 3+/5 through therEx for participation in ADLs.                         History:     Past Medical History:   Diagnosis Date    Dementia     Hypertension     Sick sinus syndrome     Thyroid disease     Unspecified glaucoma          Past Surgical History:   Procedure Laterality Date    cataract surgery       SECTION      CHOLECYSTECTOMY      HYSTERECTOMY      INSERTION OF PACEMAKER      NEPHRECTOMY         Time Tracking:     OT Date of Treatment:    OT Start Time: 955  OT Stop Time:   OT Total Time (min): 33 min    Billable Minutes:Evaluation MOD    2024    N/A

## 2024-04-05 ENCOUNTER — EMERGENCY (EMERGENCY)
Facility: HOSPITAL | Age: 59
LOS: 1 days | Discharge: HOME | End: 2024-04-05
Attending: EMERGENCY MEDICINE
Payer: COMMERCIAL

## 2024-04-05 VITALS
HEART RATE: 73 BPM | OXYGEN SATURATION: 98 % | DIASTOLIC BLOOD PRESSURE: 61 MMHG | SYSTOLIC BLOOD PRESSURE: 102 MMHG | RESPIRATION RATE: 20 BRPM | TEMPERATURE: 98 F

## 2024-04-05 VITALS
TEMPERATURE: 98 F | SYSTOLIC BLOOD PRESSURE: 129 MMHG | OXYGEN SATURATION: 99 % | HEART RATE: 95 BPM | WEIGHT: 201.5 LBS | DIASTOLIC BLOOD PRESSURE: 81 MMHG | RESPIRATION RATE: 20 BRPM | HEIGHT: 67 IN

## 2024-04-05 DIAGNOSIS — Z98.890 OTHER SPECIFIED POSTPROCEDURAL STATES: Chronic | ICD-10-CM

## 2024-04-05 LAB
ALBUMIN SERPL ELPH-MCNC: 4.4 G/DL — SIGNIFICANT CHANGE UP (ref 3.3–5.2)
ALP SERPL-CCNC: 65 U/L — SIGNIFICANT CHANGE UP (ref 40–120)
ALT FLD-CCNC: 16 U/L — SIGNIFICANT CHANGE UP
ANION GAP SERPL CALC-SCNC: 14 MMOL/L — SIGNIFICANT CHANGE UP (ref 5–17)
AST SERPL-CCNC: 22 U/L — SIGNIFICANT CHANGE UP
BASOPHILS # BLD AUTO: 0 K/UL — SIGNIFICANT CHANGE UP (ref 0–0.2)
BASOPHILS NFR BLD AUTO: 0 % — SIGNIFICANT CHANGE UP (ref 0–2)
BILIRUB SERPL-MCNC: 0.9 MG/DL — SIGNIFICANT CHANGE UP (ref 0.4–2)
BUN SERPL-MCNC: 7.6 MG/DL — LOW (ref 8–20)
CALCIUM SERPL-MCNC: 9.5 MG/DL — SIGNIFICANT CHANGE UP (ref 8.4–10.5)
CHLORIDE SERPL-SCNC: 101 MMOL/L — SIGNIFICANT CHANGE UP (ref 96–108)
CO2 SERPL-SCNC: 25 MMOL/L — SIGNIFICANT CHANGE UP (ref 22–29)
CREAT SERPL-MCNC: 0.72 MG/DL — SIGNIFICANT CHANGE UP (ref 0.5–1.3)
EGFR: 107 ML/MIN/1.73M2 — SIGNIFICANT CHANGE UP
EOSINOPHIL # BLD AUTO: 0.59 K/UL — HIGH (ref 0–0.5)
EOSINOPHIL NFR BLD AUTO: 4.4 % — SIGNIFICANT CHANGE UP (ref 0–6)
GIANT PLATELETS BLD QL SMEAR: PRESENT — SIGNIFICANT CHANGE UP
GLUCOSE SERPL-MCNC: 86 MG/DL — SIGNIFICANT CHANGE UP (ref 70–99)
HCT VFR BLD CALC: 46.3 % — SIGNIFICANT CHANGE UP (ref 39–50)
HGB BLD-MCNC: 16.3 G/DL — SIGNIFICANT CHANGE UP (ref 13–17)
LACTATE BLDV-MCNC: 1.1 MMOL/L — SIGNIFICANT CHANGE UP (ref 0.5–2)
LIDOCAIN IGE QN: 17 U/L — LOW (ref 22–51)
LYMPHOCYTES # BLD AUTO: 27.8 % — SIGNIFICANT CHANGE UP (ref 13–44)
LYMPHOCYTES # BLD AUTO: 3.75 K/UL — HIGH (ref 1–3.3)
MANUAL SMEAR VERIFICATION: SIGNIFICANT CHANGE UP
MCHC RBC-ENTMCNC: 32 PG — SIGNIFICANT CHANGE UP (ref 27–34)
MCHC RBC-ENTMCNC: 35.2 GM/DL — SIGNIFICANT CHANGE UP (ref 32–36)
MCV RBC AUTO: 91 FL — SIGNIFICANT CHANGE UP (ref 80–100)
MONOCYTES # BLD AUTO: 1.17 K/UL — HIGH (ref 0–0.9)
MONOCYTES NFR BLD AUTO: 8.7 % — SIGNIFICANT CHANGE UP (ref 2–14)
NEUTROPHILS # BLD AUTO: 7.14 K/UL — SIGNIFICANT CHANGE UP (ref 1.8–7.4)
NEUTROPHILS NFR BLD AUTO: 53 % — SIGNIFICANT CHANGE UP (ref 43–77)
PLAT MORPH BLD: NORMAL — SIGNIFICANT CHANGE UP
PLATELET # BLD AUTO: 251 K/UL — SIGNIFICANT CHANGE UP (ref 150–400)
POLYCHROMASIA BLD QL SMEAR: SIGNIFICANT CHANGE UP
POTASSIUM SERPL-MCNC: 4.2 MMOL/L — SIGNIFICANT CHANGE UP (ref 3.5–5.3)
POTASSIUM SERPL-SCNC: 4.2 MMOL/L — SIGNIFICANT CHANGE UP (ref 3.5–5.3)
PROT SERPL-MCNC: 7.4 G/DL — SIGNIFICANT CHANGE UP (ref 6.6–8.7)
RBC # BLD: 5.09 M/UL — SIGNIFICANT CHANGE UP (ref 4.2–5.8)
RBC # FLD: 13.9 % — SIGNIFICANT CHANGE UP (ref 10.3–14.5)
RBC BLD AUTO: ABNORMAL
SMUDGE CELLS # BLD: PRESENT — SIGNIFICANT CHANGE UP
SODIUM SERPL-SCNC: 140 MMOL/L — SIGNIFICANT CHANGE UP (ref 135–145)
VARIANT LYMPHS # BLD: 6.1 % — HIGH (ref 0–6)
WBC # BLD: 13.48 K/UL — HIGH (ref 3.8–10.5)
WBC # FLD AUTO: 13.48 K/UL — HIGH (ref 3.8–10.5)

## 2024-04-05 PROCEDURE — 99285 EMERGENCY DEPT VISIT HI MDM: CPT

## 2024-04-05 RX ORDER — ACETAMINOPHEN 500 MG
1000 TABLET ORAL ONCE
Refills: 0 | Status: COMPLETED | OUTPATIENT
Start: 2024-04-05 | End: 2024-04-05

## 2024-04-05 RX ORDER — FAMOTIDINE 10 MG/ML
20 INJECTION INTRAVENOUS ONCE
Refills: 0 | Status: COMPLETED | OUTPATIENT
Start: 2024-04-05 | End: 2024-04-05

## 2024-04-05 RX ORDER — ONDANSETRON 8 MG/1
8 TABLET, FILM COATED ORAL ONCE
Refills: 0 | Status: COMPLETED | OUTPATIENT
Start: 2024-04-05 | End: 2024-04-05

## 2024-04-05 RX ORDER — SODIUM CHLORIDE 9 MG/ML
1000 INJECTION INTRAMUSCULAR; INTRAVENOUS; SUBCUTANEOUS ONCE
Refills: 0 | Status: COMPLETED | OUTPATIENT
Start: 2024-04-05 | End: 2024-04-05

## 2024-04-05 RX ADMIN — FAMOTIDINE 20 MILLIGRAM(S): 10 INJECTION INTRAVENOUS at 20:02

## 2024-04-05 RX ADMIN — Medication 400 MILLIGRAM(S): at 20:02

## 2024-04-05 RX ADMIN — ONDANSETRON 8 MILLIGRAM(S): 8 TABLET, FILM COATED ORAL at 20:02

## 2024-04-05 RX ADMIN — SODIUM CHLORIDE 1000 MILLILITER(S): 9 INJECTION INTRAMUSCULAR; INTRAVENOUS; SUBCUTANEOUS at 20:03

## 2024-04-05 NOTE — ED PROVIDER NOTE - PHYSICAL EXAMINATION
Gen: Alert, NAD  Head: NC, AT, PERRL, EOMI, normal lids/conjunctiva  ENT: normal hearing, patent oropharynx   Neck: +supple, no tenderness/meningismus/JVD, +Trachea midline  Pulm: Bilateral BS, normal resp effort, no wheeze/stridor/retractions  CV: RRR, no R/G, +dist pulses  Abd: soft, mild epigastirc TTP, otherwise NT/ND, +BS, no hepatosplenomegaly  Mskel: no edema/erythema/cyanosis  Skin: no rash  Neuro: AAOx3, no gross sensory/motor deficits

## 2024-04-05 NOTE — ED ADULT NURSE NOTE - NSFALLUNIVINTERV_ED_ALL_ED
Bed/Stretcher in lowest position, wheels locked, appropriate side rails in place/Call bell, personal items and telephone in reach/Instruct patient to call for assistance before getting out of bed/chair/stretcher/Non-slip footwear applied when patient is off stretcher/[place] to call system/Physically safe environment - no spills, clutter or unnecessary equipment/Purposeful proactive rounding/Room/bathroom lighting operational, light cord in reach

## 2024-04-05 NOTE — ED PROVIDER NOTE - CLINICAL SUMMARY MEDICAL DECISION MAKING FREE TEXT BOX
57 yo male with no pmhx presents with 2 days of mid abd pain with associated nausea without vomiting. Leukocytosis, no left shift. no electrolyte abnl. CT A/P without evidence of acute findings. improvement of symptoms after meds. abd exam benign. strict return precautions explained. vss, hemodynamically stable.

## 2024-04-05 NOTE — ED PROVIDER NOTE - OBJECTIVE STATEMENT
Pertinent PMH/PSH/FHx/SHx and Review of Systems contained within:  Patient presents to the ED for 2 days of mid abd pain with associated nausea without vomiting.  No prior occurences.  no trauma.  no PMH.  Does have distant PSH of hernia repair.  Otherwise basline. normal PO intake.  normal BM.  Non toxic.  Well appearing. No aggravating or relieving factors. No other pertinent PMH.  No other pertinent PSH.  No fever/chills, No chest pain/palpitations, no SOB/cough/wheeze/stridor, no dysuria/frequency/discharge, No neck/back pain, no rash, no changes in neurological status/function.

## 2024-04-05 NOTE — ED PROVIDER NOTE - PATIENT PORTAL LINK FT
You can access the FollowMyHealth Patient Portal offered by [facility] by registering at the following website: [email or web address]. By joining [facility]’s FollowMyHealth portal, you will also be able to view your health information using other applications (apps) compatible with our system.

## 2024-04-05 NOTE — ED ADULT NURSE NOTE - OBJECTIVE STATEMENT
CC abd pain and nausea x 2 days. Denies f/c/v/d. Pt appears well but states he has been feeling bloatness and constantly burping. Pt does not appear in distress, denies sob or cp. AO4, NAD.

## 2024-04-06 PROCEDURE — 83690 ASSAY OF LIPASE: CPT

## 2024-04-06 PROCEDURE — 36415 COLL VENOUS BLD VENIPUNCTURE: CPT

## 2024-04-06 PROCEDURE — 74177 CT ABD & PELVIS W/CONTRAST: CPT | Mod: 26,MC

## 2024-04-06 PROCEDURE — 83605 ASSAY OF LACTIC ACID: CPT

## 2024-04-06 PROCEDURE — 85025 COMPLETE CBC W/AUTO DIFF WBC: CPT

## 2024-04-06 PROCEDURE — 96375 TX/PRO/DX INJ NEW DRUG ADDON: CPT

## 2024-04-06 PROCEDURE — 96374 THER/PROPH/DIAG INJ IV PUSH: CPT | Mod: XU

## 2024-04-06 PROCEDURE — 99284 EMERGENCY DEPT VISIT MOD MDM: CPT | Mod: 25

## 2024-04-06 PROCEDURE — 80053 COMPREHEN METABOLIC PANEL: CPT

## 2024-04-06 PROCEDURE — 74177 CT ABD & PELVIS W/CONTRAST: CPT | Mod: MC

## 2024-04-06 RX ORDER — FAMOTIDINE 10 MG/ML
1 INJECTION INTRAVENOUS
Qty: 14 | Refills: 0
Start: 2024-04-06 | End: 2024-04-12